# Patient Record
Sex: FEMALE | Race: WHITE | NOT HISPANIC OR LATINO | Employment: FULL TIME | URBAN - METROPOLITAN AREA
[De-identification: names, ages, dates, MRNs, and addresses within clinical notes are randomized per-mention and may not be internally consistent; named-entity substitution may affect disease eponyms.]

---

## 2023-02-23 LAB — URINE CULTURE: NORMAL

## 2023-05-16 ENCOUNTER — OFFICE VISIT (OUTPATIENT)
Dept: PRIMARY CARE | Facility: CLINIC | Age: 23
End: 2023-05-16
Payer: COMMERCIAL

## 2023-05-16 VITALS
DIASTOLIC BLOOD PRESSURE: 60 MMHG | WEIGHT: 187 LBS | HEART RATE: 104 BPM | TEMPERATURE: 97.1 F | SYSTOLIC BLOOD PRESSURE: 118 MMHG | HEIGHT: 67 IN | OXYGEN SATURATION: 98 % | BODY MASS INDEX: 29.35 KG/M2

## 2023-05-16 DIAGNOSIS — M54.16 LUMBAR RADICULOPATHY: ICD-10-CM

## 2023-05-16 DIAGNOSIS — Z3A.21 21 WEEKS GESTATION OF PREGNANCY (HHS-HCC): ICD-10-CM

## 2023-05-16 DIAGNOSIS — Z00.00 WELLNESS EXAMINATION: Primary | ICD-10-CM

## 2023-05-16 PROCEDURE — 99385 PREV VISIT NEW AGE 18-39: CPT | Performed by: FAMILY MEDICINE

## 2023-05-16 PROCEDURE — 1036F TOBACCO NON-USER: CPT | Performed by: FAMILY MEDICINE

## 2023-05-16 PROCEDURE — 99203 OFFICE O/P NEW LOW 30 MIN: CPT | Performed by: FAMILY MEDICINE

## 2023-05-16 RX ORDER — NAPROXEN SODIUM 220 MG/1
162 TABLET, FILM COATED ORAL DAILY
COMMUNITY
Start: 2023-05-14 | End: 2023-10-13 | Stop reason: ALTCHOICE

## 2023-05-16 ASSESSMENT — ENCOUNTER SYMPTOMS
VOICE CHANGE: 0
DYSURIA: 0
FATIGUE: 0
ABDOMINAL PAIN: 0
NECK STIFFNESS: 0
SLEEP DISTURBANCE: 0
SORE THROAT: 0
APPETITE CHANGE: 0
CONSTIPATION: 0
PALPITATIONS: 0
SHORTNESS OF BREATH: 0
NAUSEA: 0
NECK PAIN: 0
FEVER: 0
COUGH: 0
DIZZINESS: 0
RHINORRHEA: 0
VOMITING: 0
CHILLS: 0
BACK PAIN: 1
DIARRHEA: 0

## 2023-05-16 NOTE — PROGRESS NOTES
"Subjective   Patient ID: Cari Forde is a 22 y.o. female who presents for New Patient Visit (Having back pain injured 2 yrs ago on 4 colon, has been getting worse in the last month).    Cari is here for wellness visit and to discuss low back pain.     Two years ago had four colon accident- flew off back. Went to hospital at time. Went back and had xrays. Diagnosed with scoliosis. Symptoms had improved until 1-2 months ago. Getting severe pain in low back. Pain radiates to outer legs. Working on feet. Went to hospital for pain a few weeks ago.            Review of Systems   Constitutional:  Negative for appetite change, chills, fatigue and fever.   HENT:  Negative for congestion, rhinorrhea, sore throat and voice change.    Eyes:  Negative for visual disturbance.   Respiratory:  Negative for cough and shortness of breath.    Cardiovascular:  Negative for chest pain and palpitations.   Gastrointestinal:  Negative for abdominal pain, constipation, diarrhea, nausea and vomiting.   Genitourinary:  Negative for dysuria.   Musculoskeletal:  Positive for back pain. Negative for gait problem, neck pain and neck stiffness.   Skin:  Negative for rash.   Neurological:  Negative for dizziness.   Psychiatric/Behavioral:  Negative for sleep disturbance.        Objective   /60   Pulse 104   Temp 36.2 °C (97.1 °F)   Ht 1.702 m (5' 7\")   Wt 84.8 kg (187 lb)   SpO2 98%   BMI 29.29 kg/m²     Physical Exam  Constitutional:       General: She is not in acute distress.     Appearance: Normal appearance.   HENT:      Head: Normocephalic.      Nose: No congestion.      Mouth/Throat:      Mouth: Mucous membranes are moist.   Eyes:      Extraocular Movements: Extraocular movements intact.      Conjunctiva/sclera: Conjunctivae normal.   Cardiovascular:      Rate and Rhythm: Normal rate and regular rhythm.      Heart sounds: No murmur heard.  Pulmonary:      Effort: Pulmonary effort is normal.      Breath sounds: No " wheezing or rhonchi.   Abdominal:      Palpations: Abdomen is soft.      Tenderness: There is no abdominal tenderness.   Musculoskeletal:         General: No swelling.      Lumbar back: Spasms and tenderness present. No swelling or bony tenderness. Negative right straight leg raise test and negative left straight leg raise test.      Comments: Increased tension at b/l QL   Skin:     General: Skin is warm and dry.   Neurological:      General: No focal deficit present.      Mental Status: She is alert.   Psychiatric:         Mood and Affect: Mood normal.         Behavior: Behavior normal.         Assessment/Plan   Problem List Items Addressed This Visit          Nervous    Lumbar radiculopathy     Referred to PT. Take tylenol for pain.Ok to call for OMT.          Relevant Orders    Referral to Physical Therapy     Other Visit Diagnoses       Wellness examination    -  Primary    Due for Tdap; will receive during pregnancy. Pap smear up-to-date.    21 weeks gestation of pregnancy

## 2023-06-03 LAB
ERYTHROCYTE DISTRIBUTION WIDTH (RATIO) BY AUTOMATED COUNT: 13.2 % (ref 11.5–14.5)
ERYTHROCYTE MEAN CORPUSCULAR HEMOGLOBIN CONCENTRATION (G/DL) BY AUTOMATED: 33.6 G/DL (ref 32–36)
ERYTHROCYTE MEAN CORPUSCULAR VOLUME (FL) BY AUTOMATED COUNT: 91 FL (ref 80–100)
ERYTHROCYTES (10*6/UL) IN BLOOD BY AUTOMATED COUNT: 3.88 X10E12/L (ref 4–5.2)
GLUCOSE, 1 HR SCREEN, PREG: 158 MG/DL
HEMATOCRIT (%) IN BLOOD BY AUTOMATED COUNT: 35.4 % (ref 36–46)
HEMOGLOBIN (G/DL) IN BLOOD: 11.9 G/DL (ref 12–16)
LEUKOCYTES (10*3/UL) IN BLOOD BY AUTOMATED COUNT: 11.2 X10E9/L (ref 4.4–11.3)
PLATELETS (10*3/UL) IN BLOOD AUTOMATED COUNT: 231 X10E9/L (ref 150–450)
REFLEX ADDED, ANEMIA PANEL: ABNORMAL

## 2023-06-13 LAB
GLUCOSE THREE HOUR: 165 MG/DL
GLUCOSE TWO HOUR: 201 MG/DL
GLUCOSE, FASTING: 95 MG/DL
GLUCOSE, ONE HOUR: 222 MG/DL
GTTCM: ABNORMAL

## 2023-07-21 LAB
CREATININE (MG/DL) IN URINE: 54.9 MG/DL (ref 20–320)
PROTEIN (MG/DL) IN URINE: 7 MG/DL (ref 5–24)
PROTEIN/CREATININE (MG/MG) IN URINE: 0.13 MG/MG CREAT (ref 0–0.17)

## 2023-08-01 LAB
ALANINE AMINOTRANSFERASE (SGPT) (U/L) IN SER/PLAS: 11 U/L (ref 7–45)
ALBUMIN (G/DL) IN SER/PLAS: 3.7 G/DL (ref 3.4–5)
ALKALINE PHOSPHATASE (U/L) IN SER/PLAS: 130 U/L (ref 33–110)
ANION GAP IN SER/PLAS: 15 MMOL/L (ref 10–20)
ASPARTATE AMINOTRANSFERASE (SGOT) (U/L) IN SER/PLAS: 11 U/L (ref 9–39)
BILIRUBIN TOTAL (MG/DL) IN SER/PLAS: 0.4 MG/DL (ref 0–1.2)
CALCIUM (MG/DL) IN SER/PLAS: 9.1 MG/DL (ref 8.6–10.3)
CARBON DIOXIDE, TOTAL (MMOL/L) IN SER/PLAS: 23 MMOL/L (ref 21–32)
CHLORIDE (MMOL/L) IN SER/PLAS: 104 MMOL/L (ref 98–107)
CREATININE (MG/DL) IN SER/PLAS: 0.63 MG/DL (ref 0.5–1.05)
ERYTHROCYTE DISTRIBUTION WIDTH (RATIO) BY AUTOMATED COUNT: 12.9 % (ref 11.5–14.5)
ERYTHROCYTE MEAN CORPUSCULAR HEMOGLOBIN CONCENTRATION (G/DL) BY AUTOMATED: 34 G/DL (ref 32–36)
ERYTHROCYTE MEAN CORPUSCULAR VOLUME (FL) BY AUTOMATED COUNT: 88 FL (ref 80–100)
ERYTHROCYTES (10*6/UL) IN BLOOD BY AUTOMATED COUNT: 4.27 X10E12/L (ref 4–5.2)
GFR FEMALE: >90 ML/MIN/1.73M2
GLUCOSE (MG/DL) IN SER/PLAS: 104 MG/DL (ref 74–99)
HEMATOCRIT (%) IN BLOOD BY AUTOMATED COUNT: 37.6 % (ref 36–46)
HEMOGLOBIN (G/DL) IN BLOOD: 12.8 G/DL (ref 12–16)
LACTATE DEHYDROGENASE (U/L) IN SER/PLAS BY LAC->PYR RXN: 128 U/L (ref 84–246)
LEUKOCYTES (10*3/UL) IN BLOOD BY AUTOMATED COUNT: 11.7 X10E9/L (ref 4.4–11.3)
PLATELETS (10*3/UL) IN BLOOD AUTOMATED COUNT: 221 X10E9/L (ref 150–450)
POTASSIUM (MMOL/L) IN SER/PLAS: 3.7 MMOL/L (ref 3.5–5.3)
PROTEIN TOTAL: 6.2 G/DL (ref 6.4–8.2)
SODIUM (MMOL/L) IN SER/PLAS: 138 MMOL/L (ref 136–145)
UREA NITROGEN (MG/DL) IN SER/PLAS: 5 MG/DL (ref 6–23)

## 2023-08-16 LAB
CLUE CELLS: ABNORMAL
NUGENT SCORE: 0
URINE CULTURE: NORMAL
YEAST: PRESENT

## 2023-09-03 LAB — GROUP B STREP SCREEN: ABNORMAL

## 2023-10-12 ENCOUNTER — TELEPHONE (OUTPATIENT)
Dept: OBSTETRICS AND GYNECOLOGY | Facility: CLINIC | Age: 23
End: 2023-10-12
Payer: COMMERCIAL

## 2023-10-12 NOTE — TELEPHONE ENCOUNTER
Spoke with pt. She reports she is experiencing urinary burning and frequency, also has general discomfort that wraps from pelvic area to hips and lower back. Was seen in ED where she was evaluated for umbilical hernia and UTI sx, treated with IV atb for abnormal UA. She proceeded to telemedicine urgent care about 4 days later d/t ongoing UTI symptoms and was treated with 7 days of cipro, but symptoms are ongoing. She did not have a urine cx done with that visit. I looked back and urine cx from ER was negative. No recent fever or flank pain.     Pt also reports she discussed postpartum anxiety at 2 week PPV on 9/21/23 and was started on buspirone 10mg BID. She reports this helped with her anxiety but now is having depression symptoms. Reports flat affect, feeling withdrawn, and feels she is distancing herself from the baby. No thoughts of harming herself or others.     Next visit scheduled 10/24/23. Will review with Dr Contreras and notify pt of recommendations

## 2023-10-12 NOTE — TELEPHONE ENCOUNTER
Dr Contreras recommends pt comes in for eval. Dr Santizo willing to see patient tomorrow around 10am. Confirmed visit with pt. She will call if symptoms worsen in the mean time

## 2023-10-12 NOTE — TELEPHONE ENCOUNTER
Patient states that she was in the ER for a UTI last week, but is still having symptoms of an infection. She also states that she is having problems with her anxiety medication. Would like a call back to see if she needs to come in sooner for an appointment or what she should do. Please advise.

## 2023-10-13 ENCOUNTER — POSTPARTUM VISIT (OUTPATIENT)
Dept: OBSTETRICS AND GYNECOLOGY | Facility: CLINIC | Age: 23
End: 2023-10-13
Payer: COMMERCIAL

## 2023-10-13 VITALS
DIASTOLIC BLOOD PRESSURE: 90 MMHG | WEIGHT: 181 LBS | SYSTOLIC BLOOD PRESSURE: 130 MMHG | BODY MASS INDEX: 33.31 KG/M2 | HEIGHT: 62 IN

## 2023-10-13 DIAGNOSIS — R11.2 NAUSEA AND VOMITING, UNSPECIFIED VOMITING TYPE: ICD-10-CM

## 2023-10-13 DIAGNOSIS — N20.0 KIDNEY STONE ON LEFT SIDE: Primary | ICD-10-CM

## 2023-10-13 DIAGNOSIS — R30.0 DYSURIA: ICD-10-CM

## 2023-10-13 LAB
POC BILIRUBIN, URINE: NEGATIVE
POC BLOOD, URINE: ABNORMAL
POC GLUCOSE, URINE: NEGATIVE MG/DL
POC KETONES, URINE: NEGATIVE MG/DL
POC LEUKOCYTES, URINE: ABNORMAL
POC NITRITE,URINE: NEGATIVE
POC PH, URINE: 7 PH
POC PROTEIN, URINE: NEGATIVE MG/DL
POC SPECIFIC GRAVITY, URINE: 1.02
POC UROBILINOGEN, URINE: 2 EU/DL

## 2023-10-13 PROCEDURE — 87086 URINE CULTURE/COLONY COUNT: CPT

## 2023-10-13 PROCEDURE — 99213 OFFICE O/P EST LOW 20 MIN: CPT | Performed by: OBSTETRICS & GYNECOLOGY

## 2023-10-13 PROCEDURE — 81003 URINALYSIS AUTO W/O SCOPE: CPT | Performed by: OBSTETRICS & GYNECOLOGY

## 2023-10-13 RX ORDER — ONDANSETRON 4 MG/1
4 TABLET, FILM COATED ORAL 2 TIMES DAILY PRN
Qty: 30 TABLET | Refills: 0 | Status: SHIPPED | OUTPATIENT
Start: 2023-10-13 | End: 2023-11-06 | Stop reason: WASHOUT

## 2023-10-13 RX ORDER — BUSPIRONE HYDROCHLORIDE 10 MG/1
10 TABLET ORAL 2 TIMES DAILY
COMMUNITY
End: 2023-11-06 | Stop reason: ALTCHOICE

## 2023-10-13 ASSESSMENT — EDINBURGH POSTNATAL DEPRESSION SCALE (EPDS)
I HAVE FELT SCARED OR PANICKY FOR NO GOOD REASON: NO, NOT AT ALL
I HAVE BEEN SO UNHAPPY THAT I HAVE HAD DIFFICULTY SLEEPING: NOT AT ALL
I HAVE BEEN ABLE TO LAUGH AND SEE THE FUNNY SIDE OF THINGS: NOT QUITE SO MUCH NOW
I HAVE FELT SAD OR MISERABLE: NOT VERY OFTEN
I HAVE BEEN ANXIOUS OR WORRIED FOR NO GOOD REASON: YES, SOMETIMES
TOTAL SCORE: 7
THE THOUGHT OF HARMING MYSELF HAS OCCURRED TO ME: NEVER
THINGS HAVE BEEN GETTING ON TOP OF ME: NO, I HAVE BEEN COPING AS WELL AS EVER
I HAVE BLAMED MYSELF UNNECESSARILY WHEN THINGS WENT WRONG: NOT VERY OFTEN
I HAVE BEEN SO UNHAPPY THAT I HAVE BEEN CRYING: ONLY OCCASIONALLY
I HAVE LOOKED FORWARD WITH ENJOYMENT TO THINGS: RATHER LESS THAN I USED TO

## 2023-10-13 NOTE — PROGRESS NOTES
22 y.o.  presenting for postpartum follow-up   Went to ER on , CT scan was positve for left sided kidney stone C/o dysuria,urine culture was negative   She was started on BuSpar for postpartum depression, 4 weeks ago, she states that there is minimal improvement.  Delivery Date: 2023  Type of Delivery:   Concerns: back pain , dysuria, nausea and vomiting     Pain: controlled  Lacerations: none  Lochia: none  Menses: No  Sexual Intimacy: No  Contraceptive Method: None  Feeding Method:   Lactation Consult Needed?: No    Birth Trauma: No  Mood: Depressed,denies suicidal or homicidal ideation      IMPRESSIONS:  6-week postpartum visit with problem   Problem 1: Nephrolithiasis, urology referral done  Recommend increased hydration and Azo OTC  Problem 2: Postpartum depression  Increase BuSpar dosage to 20 mg a day  Psychology referral done for counseling.  Follow-up as scheduled with primary OB in 2 weeks    Problem List Items Addressed This Visit       Kidney stone on left side - Primary    Relevant Orders    Referral to Urology    Encounter for postpartum visit    Postpartum depression    Relevant Orders    Referral to Psychology     Other Visit Diagnoses       Dysuria        Relevant Orders    POC Urine Dip (Completed)    Referral to Urology            Orders Placed This Encounter   Procedures    Referral to Urology     Standing Status:   Future     Standing Expiration Date:   2024     Referral Priority:   Routine     Referral Type:   Consultation     Referral Reason:   Specialty Services Required     Requested Specialty:   Urology     Number of Visits Requested:   1    Referral to Psychology     Standing Status:   Future     Standing Expiration Date:   2024     Referral Priority:   Routine     Referral Type:   Consultation     Referral Reason:   Specialty Services Required     Requested Specialty:   Psychology     Number of Visits Requested:   1    POC Urine Dip     Order Specific  Question:   Release result to [facility]     Answer:   Immediate [1]       We look forward to seeing you again at your next scheduled visit in 12 months.    Nereida Santizo MD

## 2023-10-15 LAB — BACTERIA UR CULT: NORMAL

## 2023-10-18 ENCOUNTER — POSTPARTUM VISIT (OUTPATIENT)
Dept: OBSTETRICS AND GYNECOLOGY | Facility: CLINIC | Age: 23
End: 2023-10-18
Payer: COMMERCIAL

## 2023-10-18 VITALS
BODY MASS INDEX: 32.94 KG/M2 | DIASTOLIC BLOOD PRESSURE: 78 MMHG | HEIGHT: 62 IN | WEIGHT: 179 LBS | SYSTOLIC BLOOD PRESSURE: 118 MMHG

## 2023-10-18 DIAGNOSIS — O24.414 INSULIN CONTROLLED GESTATIONAL DIABETES MELLITUS (GDM) DURING PREGNANCY, ANTEPARTUM (HHS-HCC): ICD-10-CM

## 2023-10-18 RX ORDER — SERTRALINE HYDROCHLORIDE 50 MG/1
50 TABLET, FILM COATED ORAL DAILY
Qty: 90 TABLET | Refills: 3 | Status: SHIPPED | OUTPATIENT
Start: 2023-10-18 | End: 2023-11-06 | Stop reason: WASHOUT

## 2023-10-18 RX ORDER — ONDANSETRON 4 MG/1
4 TABLET, ORALLY DISINTEGRATING ORAL 3 TIMES DAILY
COMMUNITY
Start: 2023-09-23 | End: 2023-11-06 | Stop reason: WASHOUT

## 2023-10-18 NOTE — ASSESSMENT & PLAN NOTE
She is doing well postpartum. She desires to return for Nexplanon placed.   75 G GTT is ordered to follow up for gestational diabetes.

## 2023-10-18 NOTE — PROGRESS NOTES
Postpartum Progress Note    Assessment/Plan   Cari Forde is a 22 y.o., , postpartum from vaginal delivery on 2023. She delivered a girl. She is bottle feeding. Pregnancy was complicated by gestational hypertension and gestational diabetes on oral medication. She was induced at 37 weeks due to IUGR. She was seen in the ER on 2023 with findings of left kidney stone. At last visit with me on 2023 she was noting anxiety but denied depression. She was willing to start Buspirone for this. She did discuss anxiety with Dr. Santizo at visit on 10/13/2023 and increased the dose of Buspirone then.   She desires to begin antidepressant for symptoms of depression, crying and worsening anxiety. She is also to start counseling and has a referral placed.       Problem List       No episode was linked to this visit.                 Her plan for contraception is none           Allergies:   Patient has no known allergies.         Physical Exam:  Physical Exam  Constitutional:       Appearance: Normal appearance.   Genitourinary:      Bladder, rectum and urethral meatus normal.      Right Labia: No rash or lesions.     Left Labia: No lesions or rash.     No vaginal discharge.      No vaginal prolapse present.     No vaginal atrophy present.       Right Adnexa: not tender and no mass present.     Left Adnexa: not tender and no mass present.     No cervical discharge or lesion.      Uterus is not enlarged or tender.      Pelvic exam was performed with patient in the lithotomy position.   HENT:      Head: Normocephalic and atraumatic.      Nose: Nose normal.   Cardiovascular:      Rate and Rhythm: Normal rate and regular rhythm.      Heart sounds: Normal heart sounds.   Pulmonary:      Effort: Pulmonary effort is normal.      Breath sounds: Normal breath sounds.   Abdominal:      Palpations: Abdomen is soft.   Musculoskeletal:      Cervical back: Neck supple.   Neurological:      General: No focal deficit present.       Mental Status: She is alert and oriented to person, place, and time.   Skin:     General: Skin is warm and dry.      Findings: No rash.   Psychiatric:         Mood and Affect: Mood normal.            Postpartum depression  Has symptoms of anxiety and depression. BuSpar was not effective. Zoloft was prescribed on 10/18/2023. She will also begin counseling.       Encounter for postpartum visit  She is doing well postpartum. She desires to return for Nexplanon placed.   75 G GTT is ordered to follow up for gestational diabetes.

## 2023-10-18 NOTE — ASSESSMENT & PLAN NOTE
Has symptoms of anxiety and depression. BuSpar was not effective. Zoloft was prescribed on 10/18/2023. She will also begin counseling.

## 2023-10-24 ENCOUNTER — HOSPITAL ENCOUNTER (EMERGENCY)
Facility: HOSPITAL | Age: 23
Discharge: HOME | End: 2023-10-24
Attending: EMERGENCY MEDICINE
Payer: COMMERCIAL

## 2023-10-24 ENCOUNTER — APPOINTMENT (OUTPATIENT)
Dept: OBSTETRICS AND GYNECOLOGY | Facility: CLINIC | Age: 23
End: 2023-10-24
Payer: COMMERCIAL

## 2023-10-24 ENCOUNTER — APPOINTMENT (OUTPATIENT)
Dept: RADIOLOGY | Facility: HOSPITAL | Age: 23
End: 2023-10-24
Payer: COMMERCIAL

## 2023-10-24 VITALS
SYSTOLIC BLOOD PRESSURE: 114 MMHG | BODY MASS INDEX: 31.28 KG/M2 | TEMPERATURE: 97.4 F | RESPIRATION RATE: 16 BRPM | DIASTOLIC BLOOD PRESSURE: 71 MMHG | WEIGHT: 170 LBS | HEIGHT: 62 IN | HEART RATE: 78 BPM | OXYGEN SATURATION: 98 %

## 2023-10-24 DIAGNOSIS — G43.019 INTRACTABLE MIGRAINE WITHOUT AURA AND WITHOUT STATUS MIGRAINOSUS: Primary | ICD-10-CM

## 2023-10-24 LAB
ALBUMIN SERPL BCP-MCNC: 4.3 G/DL (ref 3.4–5)
ALP SERPL-CCNC: 96 U/L (ref 33–110)
ALT SERPL W P-5'-P-CCNC: 16 U/L (ref 7–45)
ANION GAP SERPL CALC-SCNC: 11 MMOL/L (ref 10–20)
APPEARANCE UR: ABNORMAL
APTT PPP: 30 SECONDS (ref 27–38)
AST SERPL W P-5'-P-CCNC: 13 U/L (ref 9–39)
BASOPHILS # BLD AUTO: 0.04 X10*3/UL (ref 0–0.1)
BASOPHILS NFR BLD AUTO: 0.5 %
BILIRUB SERPL-MCNC: 0.6 MG/DL (ref 0–1.2)
BILIRUB UR STRIP.AUTO-MCNC: NEGATIVE MG/DL
BUN SERPL-MCNC: 9 MG/DL (ref 6–23)
CALCIUM SERPL-MCNC: 9.2 MG/DL (ref 8.6–10.3)
CHLORIDE SERPL-SCNC: 102 MMOL/L (ref 98–107)
CO2 SERPL-SCNC: 29 MMOL/L (ref 21–32)
COLOR UR: YELLOW
CREAT SERPL-MCNC: 0.79 MG/DL (ref 0.5–1.05)
EOSINOPHIL # BLD AUTO: 0.09 X10*3/UL (ref 0–0.7)
EOSINOPHIL NFR BLD AUTO: 1.2 %
ERYTHROCYTE [DISTWIDTH] IN BLOOD BY AUTOMATED COUNT: 12.1 % (ref 11.5–14.5)
FLUAV RNA RESP QL NAA+PROBE: NOT DETECTED
FLUBV RNA RESP QL NAA+PROBE: NOT DETECTED
GFR SERPL CREATININE-BSD FRML MDRD: >90 ML/MIN/1.73M*2
GLUCOSE BLD MANUAL STRIP-MCNC: 164 MG/DL (ref 74–99)
GLUCOSE SERPL-MCNC: 120 MG/DL (ref 74–99)
GLUCOSE UR STRIP.AUTO-MCNC: NEGATIVE MG/DL
HCG UR QL IA.RAPID: NEGATIVE
HCT VFR BLD AUTO: 39.4 % (ref 36–46)
HGB BLD-MCNC: 13.1 G/DL (ref 12–16)
HOLD SPECIMEN: NORMAL
IMM GRANULOCYTES # BLD AUTO: 0.03 X10*3/UL (ref 0–0.7)
IMM GRANULOCYTES NFR BLD AUTO: 0.4 % (ref 0–0.9)
INR PPP: 1 (ref 0.9–1.1)
KETONES UR STRIP.AUTO-MCNC: NEGATIVE MG/DL
LEUKOCYTE ESTERASE UR QL STRIP.AUTO: ABNORMAL
LIPASE SERPL-CCNC: 14 U/L (ref 9–82)
LYMPHOCYTES # BLD AUTO: 2.92 X10*3/UL (ref 1.2–4.8)
LYMPHOCYTES NFR BLD AUTO: 40.1 %
MCH RBC QN AUTO: 28.6 PG (ref 26–34)
MCHC RBC AUTO-ENTMCNC: 33.2 G/DL (ref 32–36)
MCV RBC AUTO: 86 FL (ref 80–100)
MONOCYTES # BLD AUTO: 0.4 X10*3/UL (ref 0.1–1)
MONOCYTES NFR BLD AUTO: 5.5 %
MUCOUS THREADS #/AREA URNS AUTO: ABNORMAL /LPF
NEUTROPHILS # BLD AUTO: 3.81 X10*3/UL (ref 1.2–7.7)
NEUTROPHILS NFR BLD AUTO: 52.3 %
NITRITE UR QL STRIP.AUTO: NEGATIVE
NRBC BLD-RTO: 0 /100 WBCS (ref 0–0)
PH UR STRIP.AUTO: 6 [PH]
PLATELET # BLD AUTO: 256 X10*3/UL (ref 150–450)
PMV BLD AUTO: 9 FL (ref 7.5–11.5)
POTASSIUM SERPL-SCNC: 3.5 MMOL/L (ref 3.5–5.3)
PROT SERPL-MCNC: 6.7 G/DL (ref 6.4–8.2)
PROT UR STRIP.AUTO-MCNC: NEGATIVE MG/DL
PROTHROMBIN TIME: 11.2 SECONDS (ref 9.8–12.8)
RBC # BLD AUTO: 4.58 X10*6/UL (ref 4–5.2)
RBC # UR STRIP.AUTO: ABNORMAL /UL
RBC #/AREA URNS AUTO: ABNORMAL /HPF
SARS-COV-2 RNA RESP QL NAA+PROBE: NOT DETECTED
SODIUM SERPL-SCNC: 138 MMOL/L (ref 136–145)
SP GR UR STRIP.AUTO: 1.01
SQUAMOUS #/AREA URNS AUTO: ABNORMAL /HPF
UROBILINOGEN UR STRIP.AUTO-MCNC: <2 MG/DL
WBC # BLD AUTO: 7.3 X10*3/UL (ref 4.4–11.3)
WBC #/AREA URNS AUTO: ABNORMAL /HPF

## 2023-10-24 PROCEDURE — 87086 URINE CULTURE/COLONY COUNT: CPT | Mod: AHULAB | Performed by: EMERGENCY MEDICINE

## 2023-10-24 PROCEDURE — 85610 PROTHROMBIN TIME: CPT | Performed by: EMERGENCY MEDICINE

## 2023-10-24 PROCEDURE — 81025 URINE PREGNANCY TEST: CPT | Performed by: EMERGENCY MEDICINE

## 2023-10-24 PROCEDURE — 83690 ASSAY OF LIPASE: CPT | Performed by: EMERGENCY MEDICINE

## 2023-10-24 PROCEDURE — 76937 US GUIDE VASCULAR ACCESS: CPT

## 2023-10-24 PROCEDURE — 85025 COMPLETE CBC W/AUTO DIFF WBC: CPT | Performed by: EMERGENCY MEDICINE

## 2023-10-24 PROCEDURE — 82947 ASSAY GLUCOSE BLOOD QUANT: CPT | Mod: 59

## 2023-10-24 PROCEDURE — 70450 CT HEAD/BRAIN W/O DYE: CPT | Performed by: RADIOLOGY

## 2023-10-24 PROCEDURE — 36415 COLL VENOUS BLD VENIPUNCTURE: CPT | Performed by: EMERGENCY MEDICINE

## 2023-10-24 PROCEDURE — 2500000004 HC RX 250 GENERAL PHARMACY W/ HCPCS (ALT 636 FOR OP/ED): Performed by: EMERGENCY MEDICINE

## 2023-10-24 PROCEDURE — 96375 TX/PRO/DX INJ NEW DRUG ADDON: CPT

## 2023-10-24 PROCEDURE — 80053 COMPREHEN METABOLIC PANEL: CPT | Performed by: EMERGENCY MEDICINE

## 2023-10-24 PROCEDURE — 99284 EMERGENCY DEPT VISIT MOD MDM: CPT | Mod: 25 | Performed by: EMERGENCY MEDICINE

## 2023-10-24 PROCEDURE — 85730 THROMBOPLASTIN TIME PARTIAL: CPT | Performed by: EMERGENCY MEDICINE

## 2023-10-24 PROCEDURE — 87636 SARSCOV2 & INF A&B AMP PRB: CPT | Performed by: EMERGENCY MEDICINE

## 2023-10-24 PROCEDURE — 82947 ASSAY GLUCOSE BLOOD QUANT: CPT

## 2023-10-24 PROCEDURE — 70450 CT HEAD/BRAIN W/O DYE: CPT

## 2023-10-24 PROCEDURE — 96374 THER/PROPH/DIAG INJ IV PUSH: CPT

## 2023-10-24 PROCEDURE — 96361 HYDRATE IV INFUSION ADD-ON: CPT

## 2023-10-24 PROCEDURE — 81001 URINALYSIS AUTO W/SCOPE: CPT | Performed by: EMERGENCY MEDICINE

## 2023-10-24 RX ORDER — KETOROLAC TROMETHAMINE 30 MG/ML
15 INJECTION, SOLUTION INTRAMUSCULAR; INTRAVENOUS ONCE
Status: COMPLETED | OUTPATIENT
Start: 2023-10-24 | End: 2023-10-24

## 2023-10-24 RX ORDER — METOCLOPRAMIDE HYDROCHLORIDE 5 MG/ML
10 INJECTION INTRAMUSCULAR; INTRAVENOUS ONCE
Status: COMPLETED | OUTPATIENT
Start: 2023-10-24 | End: 2023-10-24

## 2023-10-24 RX ADMIN — METOCLOPRAMIDE 10 MG: 5 INJECTION, SOLUTION INTRAMUSCULAR; INTRAVENOUS at 07:17

## 2023-10-24 RX ADMIN — SODIUM CHLORIDE, POTASSIUM CHLORIDE, SODIUM LACTATE AND CALCIUM CHLORIDE 1000 ML: 600; 310; 30; 20 INJECTION, SOLUTION INTRAVENOUS at 07:21

## 2023-10-24 RX ADMIN — KETOROLAC TROMETHAMINE 15 MG: 30 INJECTION, SOLUTION INTRAMUSCULAR; INTRAVENOUS at 07:17

## 2023-10-24 ASSESSMENT — PAIN SCALES - GENERAL
PAINLEVEL_OUTOF10: 0 - NO PAIN
PAINLEVEL_OUTOF10: 7
PAINLEVEL_OUTOF10: 7

## 2023-10-24 ASSESSMENT — COLUMBIA-SUICIDE SEVERITY RATING SCALE - C-SSRS
1. IN THE PAST MONTH, HAVE YOU WISHED YOU WERE DEAD OR WISHED YOU COULD GO TO SLEEP AND NOT WAKE UP?: NO
6. HAVE YOU EVER DONE ANYTHING, STARTED TO DO ANYTHING, OR PREPARED TO DO ANYTHING TO END YOUR LIFE?: NO
2. HAVE YOU ACTUALLY HAD ANY THOUGHTS OF KILLING YOURSELF?: NO

## 2023-10-24 ASSESSMENT — PAIN - FUNCTIONAL ASSESSMENT
PAIN_FUNCTIONAL_ASSESSMENT: 0-10
PAIN_FUNCTIONAL_ASSESSMENT: 0-10

## 2023-10-24 NOTE — DISCHARGE INSTRUCTIONS
Tylenol and or Motrin as needed for headache for the next 2 to 3 days.  Please follow-up with your primary care physician in the next 2 to 3 days repeat exam to ensure improvement in symptoms please return to ED for worsening headache, difficulty moving arms legs, change in behavior or any other concerning symptoms.

## 2023-10-24 NOTE — ED TRIAGE NOTES
Pt 6 weeks PP, HTN and gestational diabetes in pregnancy, arrives to ED with c/o intermittent HA x 1.5 weeks. Endorses photophobia and blurred vision that started today as well as nausea without vomiting. Pt /99 and bgl 164 in triage, not on medications for HTN or DM.

## 2023-10-24 NOTE — ED PROVIDER NOTES
HPI   Chief Complaint   Patient presents with    Headache       This is a 22-year-old female who is 6 weeks status postdelivery who does present with complaint of intermittent headaches for the last week and some nausea and vomiting.  Denies any thunderclap headache.  Denies headache as a worst of life.  Denies any fever or chills.  Denies any dysuria hematuria flank pain.  No vaginal pain or bleeding or discharge.  BPs have been fairly well controlled at home per patient report and she has not needed blood pressure medications or treatment for her gestational diabetes.  She denies any prior history of headaches so this is somewhat atypical for her.  She denies any meningitis symptoms.                        No data recorded                Patient History   No past medical history on file.  Past Surgical History:   Procedure Laterality Date    WISDOM TOOTH EXTRACTION       Family History   Problem Relation Name Age of Onset    Hypertension Mother      Diabetes Maternal Grandmother      Hypertension Maternal Grandmother      Breast cancer Maternal Grandmother       Social History     Tobacco Use    Smoking status: Never    Smokeless tobacco: Never   Vaping Use    Vaping Use: Former    Substances: Nicotine    Devices: Disposable   Substance Use Topics    Alcohol use: Not Currently     Alcohol/week: 4.0 standard drinks of alcohol     Types: 2 Glasses of wine, 2 Standard drinks or equivalent per week    Drug use: Never       Physical Exam   ED Triage Vitals   Temp Heart Rate Resp BP   10/24/23 0612 10/24/23 0612 10/24/23 0612 10/24/23 0612   36.3 °C (97.4 °F) 95 16 (!) 141/99      SpO2 Temp Source Heart Rate Source Patient Position   10/24/23 0612 10/24/23 0612 10/24/23 0900 10/24/23 0900   97 % Tympanic Monitor Sitting      BP Location FiO2 (%)     10/24/23 0900 --     Right arm        Physical Exam  Vitals and nursing note reviewed.   Constitutional:       Appearance: She is well-developed.   HENT:      Head:  Normocephalic and atraumatic.      Mouth/Throat:      Mouth: Mucous membranes are moist.   Eyes:      General: No visual field deficit.     Extraocular Movements: Extraocular movements intact.      Pupils: Pupils are equal, round, and reactive to light.   Cardiovascular:      Rate and Rhythm: Normal rate and regular rhythm.      Heart sounds: Normal heart sounds.   Pulmonary:      Effort: Pulmonary effort is normal.      Breath sounds: Normal breath sounds.   Abdominal:      General: Bowel sounds are normal.   Musculoskeletal:         General: Normal range of motion.      Cervical back: Normal range of motion and neck supple.   Skin:     General: Skin is warm and dry.      Capillary Refill: Capillary refill takes less than 2 seconds.   Neurological:      Mental Status: She is alert and oriented to person, place, and time. Mental status is at baseline.      GCS: GCS eye subscore is 4. GCS verbal subscore is 5. GCS motor subscore is 5.      Cranial Nerves: No cranial nerve deficit or facial asymmetry.      Sensory: No sensory deficit.      Motor: No weakness.      Coordination: Coordination normal.      Gait: Gait normal.   Psychiatric:         Mood and Affect: Mood normal.         Speech: Speech normal.         Behavior: Behavior normal.         ED Course & MDM   Diagnoses as of 10/31/23 0642   Intractable migraine without aura and without status migrainosus       Medical Decision Making  I did place an IV and labs were drawn including CBC and CMP and flu and COVID and CT scan of the head and UA.  CT scan was obtained due to atypical nature of headache.  This is negative for acute pathology.  No elevated blood cell count or stomach infection.  No clinically significant anemia.  No electrolyte abnormalities are noted.  She does have 11-20 WBCs and 6-10 white blood cells but a lot of squamous cells and does not have any UTI type symptoms here.  She does not have elevated blood cell count or stomach infection.  Her  pregnancy test was negative.  COVID test is negative.  Flu and a and B test were negative.  Patient is tolerating p.o.  Her headache does feel better status post Reglan Toradol and fluids.  Patient to be discharged home plan to continue Tylenol Motrin and follow-up with her OB/GYN.  Advised to continue blood pressure monitor at home and is well as further management with OB for her glucose to rule out diabetes and hypertension respectively.  Turn precautions are discussed.        Procedure  Procedures     Brady Mcgee MD  10/24/23 1115       Brady Mcgee MD  10/31/23 0638

## 2023-10-25 LAB — BACTERIA UR CULT: NORMAL

## 2023-10-27 ENCOUNTER — TELEPHONE (OUTPATIENT)
Dept: OBSTETRICS AND GYNECOLOGY | Facility: CLINIC | Age: 23
End: 2023-10-27
Payer: COMMERCIAL

## 2023-10-27 NOTE — TELEPHONE ENCOUNTER
Spoke with patient, states developed numbness then rash after taking zoloft for just over a week.  Did not take last nights dose and is feeling better today with no numbness and improved rash.  Feels as though zoloft was helping her, is there something else she can try?

## 2023-10-27 NOTE — TELEPHONE ENCOUNTER
Patient called stating that yesterday she felt weird and her face was numb. She also claims that she began to get a rash on her cheeks and worked its way down to her neck.  She started on Zoloft last week and is concerned she is having some type of reaction to the medication. Did not have any shortness of breath. She did go to her pharmacist and he told her she was having some type of reaction.  She hasn't taken her medication since Wednesday evening.  She doesn't know what she should do.     She said she took the medication for a total of 7 days.    Please advise

## 2023-10-28 RX ORDER — ESCITALOPRAM OXALATE 10 MG/1
10 TABLET ORAL DAILY
Qty: 30 TABLET | Refills: 11 | Status: SHIPPED | OUTPATIENT
Start: 2023-10-28 | End: 2024-10-27

## 2023-10-30 ENCOUNTER — APPOINTMENT (OUTPATIENT)
Dept: PRIMARY CARE | Facility: CLINIC | Age: 23
End: 2023-10-30
Payer: COMMERCIAL

## 2023-11-02 ENCOUNTER — HOSPITAL ENCOUNTER (EMERGENCY)
Facility: HOSPITAL | Age: 23
Discharge: LEFT BEFORE DISCHARGE | End: 2023-11-02
Payer: COMMERCIAL

## 2023-11-02 PROCEDURE — 4500999001 HC ED NO CHARGE

## 2023-11-06 ENCOUNTER — OFFICE VISIT (OUTPATIENT)
Dept: PRIMARY CARE | Facility: CLINIC | Age: 23
End: 2023-11-06
Payer: COMMERCIAL

## 2023-11-06 ENCOUNTER — PROCEDURE VISIT (OUTPATIENT)
Dept: OBSTETRICS AND GYNECOLOGY | Facility: CLINIC | Age: 23
End: 2023-11-06
Payer: COMMERCIAL

## 2023-11-06 VITALS
BODY MASS INDEX: 32.06 KG/M2 | WEIGHT: 181 LBS | HEART RATE: 100 BPM | DIASTOLIC BLOOD PRESSURE: 84 MMHG | OXYGEN SATURATION: 98 % | TEMPERATURE: 97 F | SYSTOLIC BLOOD PRESSURE: 122 MMHG

## 2023-11-06 VITALS
HEIGHT: 63 IN | DIASTOLIC BLOOD PRESSURE: 80 MMHG | WEIGHT: 179 LBS | SYSTOLIC BLOOD PRESSURE: 122 MMHG | BODY MASS INDEX: 31.71 KG/M2

## 2023-11-06 DIAGNOSIS — R10.9 LEFT FLANK PAIN: ICD-10-CM

## 2023-11-06 DIAGNOSIS — Z30.017 NEXPLANON INSERTION: Primary | ICD-10-CM

## 2023-11-06 DIAGNOSIS — N20.0 KIDNEY STONE ON LEFT SIDE: Primary | ICD-10-CM

## 2023-11-06 DIAGNOSIS — M54.50 ACUTE BILATERAL LOW BACK PAIN WITHOUT SCIATICA: ICD-10-CM

## 2023-11-06 DIAGNOSIS — Z30.017 ENCOUNTER FOR INSERTION SUBDERMAL CONTRACEPTIVE: ICD-10-CM

## 2023-11-06 LAB
POC APPEARANCE, URINE: CLEAR
POC BILIRUBIN, URINE: NEGATIVE
POC BLOOD, URINE: ABNORMAL
POC COLOR, URINE: YELLOW
POC GLUCOSE, URINE: NEGATIVE MG/DL
POC KETONES, URINE: NEGATIVE MG/DL
POC LEUKOCYTES, URINE: ABNORMAL
POC NITRITE,URINE: NEGATIVE
POC PH, URINE: 6 PH
POC PROTEIN, URINE: NEGATIVE MG/DL
POC SPECIFIC GRAVITY, URINE: 1.02
POC UROBILINOGEN, URINE: 0.2 EU/DL
PREGNANCY TEST URINE, POC: NEGATIVE

## 2023-11-06 PROCEDURE — 1036F TOBACCO NON-USER: CPT | Performed by: FAMILY MEDICINE

## 2023-11-06 PROCEDURE — 11981 INSERTION DRUG DLVR IMPLANT: CPT | Performed by: NURSE PRACTITIONER

## 2023-11-06 PROCEDURE — 81025 URINE PREGNANCY TEST: CPT | Performed by: NURSE PRACTITIONER

## 2023-11-06 PROCEDURE — 81003 URINALYSIS AUTO W/O SCOPE: CPT | Performed by: FAMILY MEDICINE

## 2023-11-06 PROCEDURE — 99214 OFFICE O/P EST MOD 30 MIN: CPT | Performed by: FAMILY MEDICINE

## 2023-11-06 PROCEDURE — 87086 URINE CULTURE/COLONY COUNT: CPT

## 2023-11-06 RX ORDER — IBUPROFEN 600 MG/1
600 TABLET ORAL EVERY 6 HOURS PRN
Qty: 30 TABLET | Refills: 1 | Status: SHIPPED | OUTPATIENT
Start: 2023-11-06 | End: 2024-11-05

## 2023-11-06 RX ORDER — ETONOGESTREL 68 MG/1
1 IMPLANT SUBCUTANEOUS ONCE
COMMUNITY

## 2023-11-06 ASSESSMENT — ENCOUNTER SYMPTOMS
BACK PAIN: 1
NEUROLOGICAL NEGATIVE: 1
MUSCULOSKELETAL NEGATIVE: 1
FLANK PAIN: 1
EYES NEGATIVE: 1
FREQUENCY: 0
CARDIOVASCULAR NEGATIVE: 1
DYSURIA: 0
GASTROINTESTINAL NEGATIVE: 1
RESPIRATORY NEGATIVE: 1
DIFFICULTY URINATING: 0
PSYCHIATRIC NEGATIVE: 1
CONSTITUTIONAL NEGATIVE: 1
ENDOCRINE NEGATIVE: 1

## 2023-11-06 NOTE — PROGRESS NOTES
Subjective   Patient ID: Cari Forde is a 22 y.o. female who presents for Back Pain (X 1 month   Pt has kidney stones).    Cari presents to discuss back pain and kidney stones. She has been having pain in her low back and left flank area. Sometimes so severe that she feels nauseated. Pain across low back. No radiation into legs. Using heating pad with minimal improvement. Is scheduled with urologist in December.          Review of Systems   Genitourinary:  Positive for flank pain. Negative for difficulty urinating, dysuria and frequency.   Musculoskeletal:  Positive for back pain.       Objective   /84   Pulse 100   Temp 36.1 °C (97 °F)   Wt 82.1 kg (181 lb)   LMP 11/02/2023   SpO2 98%   BMI 32.06 kg/m²     Physical Exam  Constitutional:       General: She is not in acute distress.     Appearance: Normal appearance.   HENT:      Head: Normocephalic.      Mouth/Throat:      Mouth: Mucous membranes are moist.   Eyes:      Extraocular Movements: Extraocular movements intact.      Conjunctiva/sclera: Conjunctivae normal.   Cardiovascular:      Rate and Rhythm: Normal rate and regular rhythm.      Heart sounds: No murmur heard.  Pulmonary:      Breath sounds: No wheezing or rhonchi.   Musculoskeletal:      Cervical back: Neck supple.      Lumbar back: Tenderness present. No bony tenderness. Normal range of motion.   Skin:     General: Skin is warm and dry.   Neurological:      Mental Status: She is alert.   Psychiatric:         Mood and Affect: Mood normal.         Behavior: Behavior normal.         Assessment/Plan   Diagnoses and all orders for this visit:  Kidney stone on left side  Comments:  Scheduled with urology in December. Discussed ibuprofen for pain control. Stay hydrated. Since sontes <5mm, does not meet criteria for alpha blocker.  Acute bilateral low back pain without sciatica  Comments:  Continue heat. Start stretching. Use ibuprofen as needed.  Orders:  -     ibuprofen 600 mg tablet; Take  1 tablet (600 mg) by mouth every 6 hours if needed for mild pain (1 - 3) (pain).  Left flank pain  -     Urine Culture; Future  -     POCT UA Automated manually resulted

## 2023-11-06 NOTE — PROGRESS NOTES
Subjective   Patient ID: Cari Forde is a 22 y.o. female who presents for Nexplanon insertion  (Patient started her period 11/02/2023).  22 year old here for nexplanon insertion.          Review of Systems   Constitutional: Negative.    HENT: Negative.     Eyes: Negative.    Respiratory: Negative.     Cardiovascular: Negative.    Gastrointestinal: Negative.    Endocrine: Negative.    Genitourinary: Negative.    Musculoskeletal: Negative.    Skin: Negative.    Neurological: Negative.    Psychiatric/Behavioral: Negative.         Objective   Physical Exam  Vitals reviewed.   Constitutional:       Appearance: Normal appearance. She is well-developed.   Pulmonary:      Effort: Pulmonary effort is normal. No respiratory distress.   Chest:   Breasts:     Breasts are symmetrical.      Right: Normal. No swelling, bleeding, inverted nipple, mass, nipple discharge, skin change or tenderness.      Left: Normal. No swelling, bleeding, inverted nipple, mass, nipple discharge, skin change or tenderness.   Abdominal:      Palpations: Abdomen is soft.   Genitourinary:     Vagina: Vaginal discharge present.      Cervix: Normal.      Uterus: Normal.       Adnexa: Right adnexa normal and left adnexa normal.      Rectum: Normal.   Musculoskeletal:         General: Normal range of motion.   Lymphadenopathy:      Upper Body:      Right upper body: No supraclavicular, axillary or pectoral adenopathy.      Left upper body: No supraclavicular, axillary or pectoral adenopathy.   Skin:     General: Skin is warm and dry.   Neurological:      General: No focal deficit present.      Mental Status: She is alert and oriented to person, place, and time. Mental status is at baseline.   Psychiatric:         Attention and Perception: Attention and perception normal.         Mood and Affect: Mood and affect normal.         Speech: Speech normal.         Behavior: Behavior normal. Behavior is cooperative.         Thought Content: Thought content  normal.         Judgment: Judgment normal.         Assessment/Plan   Problem List Items Addressed This Visit             ICD-10-CM    Nexplanon insertion - Primary Z30.017     Other Visit Diagnoses         Codes    Encounter for insertion subdermal contraceptive     Z30.017    Relevant Medications    etonogestrel-eluting contraceptive implant    Other Relevant Orders    POC pregnancy, urine (Completed)        Nexplanon inserted  Follow up as neededPatient ID: Cari Forde is a 22 y.o. female.    Nexplanon insertion    Date/Time: 11/6/2023 12:00 PM    Performed by: RUDY Hernandez  Authorized by: RUDY Hernandez    Consent:     Consent obtained:  Written    Consent given by:  Patient    Procedural risks discussed:  Bleeding and infection    Patient questions answered: yes      Patient agrees, verbalizes understanding, and wants to proceed: yes      Educational handouts given: yes      Instructions and paperwork completed: no    Indication:     Indication: Insertion of non-biodegradable drug delivery implant    Pre-procedure:     Pre-procedure timeout performed: yes      Prepped with comment:  Betadine    Local anesthetic:  Lidocaine 1%    The site was cleaned and prepped in a sterile fashion: yes    Procedure:     Procedure:  Insertion    Small stab incision was made in arm: yes      Left/right:  Left    Preloaded contraceptive capsule trocar was placed subdermally: yes      Visualization of implant was obtained: yes      Contraceptive capsule was inserted and trocar removed: yes      Visualization of notch in stylet and palpation of device: yes      Palpation confirms placement by provider and patient: yes      Site was closed with steri-strips and pressure bandage applied: yes

## 2023-11-08 LAB — BACTERIA UR CULT: NORMAL

## 2023-12-08 ENCOUNTER — TELEPHONE (OUTPATIENT)
Dept: OBSTETRICS AND GYNECOLOGY | Facility: CLINIC | Age: 23
End: 2023-12-08
Payer: COMMERCIAL

## 2023-12-08 NOTE — TELEPHONE ENCOUNTER
Patient called and stated she had the Nexplanon inserted 11/6 and has been spotting ever since.  She also gets nauseated. Please advise.

## 2023-12-18 ENCOUNTER — OFFICE VISIT (OUTPATIENT)
Dept: UROLOGY | Facility: HOSPITAL | Age: 23
End: 2023-12-18
Payer: COMMERCIAL

## 2023-12-18 DIAGNOSIS — N20.0 KIDNEY STONE ON LEFT SIDE: Primary | ICD-10-CM

## 2023-12-18 LAB
POC APPEARANCE, URINE: CLEAR
POC BILIRUBIN, URINE: NEGATIVE
POC BLOOD, URINE: NEGATIVE
POC COLOR, URINE: YELLOW
POC GLUCOSE, URINE: NEGATIVE MG/DL
POC KETONES, URINE: NEGATIVE MG/DL
POC LEUKOCYTES, URINE: ABNORMAL
POC NITRITE,URINE: NEGATIVE
POC PH, URINE: 7 PH
POC PROTEIN, URINE: NEGATIVE MG/DL
POC SPECIFIC GRAVITY, URINE: 1.02
POC UROBILINOGEN, URINE: 0.2 EU/DL

## 2023-12-18 PROCEDURE — 99214 OFFICE O/P EST MOD 30 MIN: CPT | Performed by: NURSE PRACTITIONER

## 2023-12-18 PROCEDURE — 1036F TOBACCO NON-USER: CPT | Performed by: NURSE PRACTITIONER

## 2023-12-18 PROCEDURE — 81003 URINALYSIS AUTO W/O SCOPE: CPT | Performed by: NURSE PRACTITIONER

## 2023-12-18 PROCEDURE — 99204 OFFICE O/P NEW MOD 45 MIN: CPT | Performed by: NURSE PRACTITIONER

## 2023-12-18 NOTE — PROGRESS NOTES
Subjective   Patient ID: Cari Forde is a 23 y.o. female NPV     HPI  Patient presents to clinic today NPV with complaint of intermittent bilateral flank pain.   She denies gross hematuria. History of microscopic hematuria. Menses are irregular, recent   Nexplanon implant, and she has a 3 month old baby.   She denies bothersome urinary symptoms. No frequency, urgency, dysuria, fevers, nausea, or chills.   Reports BL flank pain is intermittent, non radiating. No CVA tenderness.     I personally reviewed and discussed CT Abd and Pelvis w Contrast 09/23/2023 which  demonstrates   3 mm and 2 mm nonobstructive left calculi. No hydronephrosis. IMPRESSION: Small nonobstructive left renal calculi.    She is unable to leave urine sample today     Review of Systems  All other systems have been reviewed and are negative for complaint.    Objective   Physical Exam  General: Well developed, well nourished, alert and cooperative, appears in no acute distress  Eyes: Non-injected conjunctiva, sclera clear, no proptosis  Cardiac: Extremities are warm and well perfused. No edema, cyanosis or pallor.   Lungs: Breathing is easy, non-labored. Speaking in clear and complete sentences. Normal diaphragmatic movement.  MSK: Ambulatory with steady gait, unassisted  Neuro: alert and oriented to person, place and time  Psych: Demonstrates good judgement and reason, without hallucinations, abnormal affect or abnormal behaviors.  Skin: no obvious lesions, no rashes.    Assessment/Plan   Diagnoses and all orders for this visit:  Kidney stone on left side  -     Referral to Urology  -     US renal complete; Future    We discussed that most calculi under 5 mm can be safely observed. This recommendation is based on large series looking at spontaneous passage rates that suggest that the likelihood of stone passage is highest for stones under 4 mm in size (approximately 70-80%), moderate for stones between 4 and 6 mm (50%), and lowest for stones 6  mm or greater (20%-30%). However, stones under 5 mm that are in a solitary kidney, associated with infection or causing significant obstruction should be removed to prevent loss of renal function and/or sepsis. Also, every patient has different anatomy and different ability to pass calculi and tolerate pain, so intervention is also recommended in patients with small stones that are in significant discomfort or that have a history of being unable to pass small calculi. Recommend that she strain urine    Patient was advised to present to local Emergency Department for development of fevers, chills, nausea, vomiting or flank pain that is not controlled with oral pain medication.    Please schedule Renal Ultrasound. Patient will follow up virtually to discuss results.     All questions and concerns were addressed. Patient verbalizes understanding and has no other questions at this time.   You are able to have email access to your chart. You can sign into [employer] or add the Follow My Health maddie on your smart phone to review today's visit, laboratory work and imaging.     Milady Farrell-- TONE SETH  Office Phone:  [phone]

## 2023-12-28 ENCOUNTER — HOSPITAL ENCOUNTER (OUTPATIENT)
Dept: RADIOLOGY | Facility: HOSPITAL | Age: 23
End: 2023-12-28
Payer: COMMERCIAL

## 2024-01-29 ENCOUNTER — HOSPITAL ENCOUNTER (OUTPATIENT)
Dept: RADIOLOGY | Facility: CLINIC | Age: 24
Discharge: HOME | End: 2024-01-29
Payer: COMMERCIAL

## 2024-01-29 DIAGNOSIS — N20.0 KIDNEY STONE ON LEFT SIDE: ICD-10-CM

## 2024-01-29 PROCEDURE — 76770 US EXAM ABDO BACK WALL COMP: CPT

## 2024-03-18 ENCOUNTER — APPOINTMENT (OUTPATIENT)
Dept: PRIMARY CARE | Facility: CLINIC | Age: 24
End: 2024-03-18
Payer: COMMERCIAL

## 2024-03-29 ENCOUNTER — OFFICE VISIT (OUTPATIENT)
Dept: PRIMARY CARE | Facility: CLINIC | Age: 24
End: 2024-03-29
Payer: COMMERCIAL

## 2024-03-29 ENCOUNTER — LAB (OUTPATIENT)
Dept: LAB | Facility: LAB | Age: 24
End: 2024-03-29
Payer: COMMERCIAL

## 2024-03-29 VITALS
BODY MASS INDEX: 33.83 KG/M2 | WEIGHT: 191 LBS | SYSTOLIC BLOOD PRESSURE: 118 MMHG | OXYGEN SATURATION: 97 % | TEMPERATURE: 97 F | HEART RATE: 76 BPM | DIASTOLIC BLOOD PRESSURE: 70 MMHG

## 2024-03-29 DIAGNOSIS — R23.3 EASY BRUISING: ICD-10-CM

## 2024-03-29 DIAGNOSIS — E65 FAT PAD: ICD-10-CM

## 2024-03-29 DIAGNOSIS — G44.209 TENSION HEADACHE: Primary | ICD-10-CM

## 2024-03-29 PROCEDURE — 99214 OFFICE O/P EST MOD 30 MIN: CPT | Performed by: FAMILY MEDICINE

## 2024-03-29 PROCEDURE — 1036F TOBACCO NON-USER: CPT | Performed by: FAMILY MEDICINE

## 2024-03-29 RX ORDER — CYCLOBENZAPRINE HCL 5 MG
5 TABLET ORAL NIGHTLY PRN
Qty: 15 TABLET | Refills: 0 | Status: SHIPPED | OUTPATIENT
Start: 2024-03-29

## 2024-03-29 ASSESSMENT — ENCOUNTER SYMPTOMS
NECK PAIN: 1
DIZZINESS: 1
HEADACHES: 1

## 2024-03-29 NOTE — PROGRESS NOTES
Subjective   Patient ID: Cari Forde is a 23 y.o. female who presents for Headache (X 1 month/).    Cari presents to discuss headaches. She has been having recurrent headaches for the past month. Located on front part of head and at temporal region. Feels like pressure. Associated neck pain. Tried taking multiple OTC medications but still having persistent headaches.     She has noticed easy bruising. No recent bleeding.          Review of Systems   Musculoskeletal:  Positive for neck pain.   Neurological:  Positive for dizziness and headaches.       Objective   /70   Pulse 76   Temp 36.1 °C (97 °F)   Wt 86.6 kg (191 lb)   SpO2 97%   BMI 33.83 kg/m²     Physical Exam  Constitutional:       General: She is not in acute distress.     Appearance: Normal appearance.   HENT:      Head: Normocephalic.   Pulmonary:      Effort: Pulmonary effort is normal.   Musculoskeletal:      Cervical back: Deformity and tenderness present. No swelling, rigidity or bony tenderness. Normal range of motion.      Comments: Large fat pad deposit at back of neck.   Neurological:      General: No focal deficit present.      Mental Status: She is alert.   Psychiatric:         Mood and Affect: Mood normal.         Assessment/Plan   Diagnoses and all orders for this visit:  Tension headache  Comments:  Start cyclobenzaprine prn. Recomend heat to neck and stretching. If not improving, consider neck XR and PT.  Orders:  -     cyclobenzaprine (Flexeril) 5 mg tablet; Take 1 tablet (5 mg) by mouth as needed at bedtime for muscle spasms.  Fat pad  Comments:  Cushing-like fat pad deposition at posterior neck. Since patient has also noted easy bruising, will check 24-h urine cortisol. Patient declined labs.  Orders:  -     Cortisol, Urine, Free; Future  Easy bruising  -     Cortisol, Urine, Free; Future

## 2024-04-03 ENCOUNTER — LAB (OUTPATIENT)
Dept: LAB | Facility: LAB | Age: 24
End: 2024-04-03
Payer: COMMERCIAL

## 2024-04-03 PROCEDURE — 82530 CORTISOL FREE: CPT

## 2024-04-07 LAB
ANNOTATION COMMENT IMP: NORMAL
COLLECT DURATION TIME SPEC: 24 HR
CORTIS F 24H UR HPLC-MCNC: 20.8 UG/L
CORTIS F 24H UR-MRATE: 12.5 UG/D
CORTIS F/CREAT 24H UR: 11.24 UG/G CRT
CREAT 24H UR-MRATE: 1110 MG/D (ref 700–1600)
CREAT UR-MCNC: 185 MG/DL
SPECIMEN VOL ?TM UR: 600 ML

## 2024-04-09 ENCOUNTER — TELEPHONE (OUTPATIENT)
Dept: PRIMARY CARE | Facility: CLINIC | Age: 24
End: 2024-04-09
Payer: COMMERCIAL

## 2024-04-09 NOTE — TELEPHONE ENCOUNTER
Please let patient know that her urine test was normal. She likely does not have Cushing's disease.

## 2024-04-17 ENCOUNTER — APPOINTMENT (OUTPATIENT)
Dept: PRIMARY CARE | Facility: CLINIC | Age: 24
End: 2024-04-17
Payer: COMMERCIAL

## 2024-05-03 ENCOUNTER — APPOINTMENT (OUTPATIENT)
Dept: PRIMARY CARE | Facility: CLINIC | Age: 24
End: 2024-05-03
Payer: COMMERCIAL

## 2024-05-10 ENCOUNTER — APPOINTMENT (OUTPATIENT)
Dept: PRIMARY CARE | Facility: CLINIC | Age: 24
End: 2024-05-10
Payer: COMMERCIAL

## 2024-05-10 RX ORDER — SYRINGE AND NEEDLE,INSULIN,1ML 28GX1/2"
SYRINGE, EMPTY DISPOSABLE MISCELLANEOUS
COMMUNITY
Start: 2024-05-01

## 2024-05-10 RX ORDER — BROMPHENIRAMINE MALEATE, PSEUDOEPHEDRINE HYDROCHLORIDE, AND DEXTROMETHORPHAN HYDROBROMIDE 2; 30; 10 MG/5ML; MG/5ML; MG/5ML
SYRUP ORAL
COMMUNITY
Start: 2024-05-01

## 2024-05-10 RX ORDER — AMOXICILLIN 500 MG/1
500 CAPSULE ORAL 2 TIMES DAILY
COMMUNITY
Start: 2024-05-06

## 2024-05-17 ENCOUNTER — APPOINTMENT (OUTPATIENT)
Dept: PRIMARY CARE | Facility: CLINIC | Age: 24
End: 2024-05-17
Payer: COMMERCIAL

## 2024-07-18 PROBLEM — Z97.5 NEXPLANON IN PLACE: Status: ACTIVE | Noted: 2023-11-06

## 2024-07-24 ENCOUNTER — APPOINTMENT (OUTPATIENT)
Dept: OBSTETRICS AND GYNECOLOGY | Facility: CLINIC | Age: 24
End: 2024-07-24
Payer: COMMERCIAL

## 2024-07-26 ENCOUNTER — TELEPHONE (OUTPATIENT)
Dept: OBSTETRICS AND GYNECOLOGY | Facility: CLINIC | Age: 24
End: 2024-07-26
Payer: COMMERCIAL

## 2024-07-26 NOTE — TELEPHONE ENCOUNTER
Patient would like refills of her medication LEXAPRO sent into her pharmacy please.     Pharmacy has changed to - [facility] in [place] (updated in chart).

## 2024-07-29 RX ORDER — ESCITALOPRAM OXALATE 10 MG/1
10 TABLET ORAL DAILY
Qty: 30 TABLET | Refills: 11 | Status: SHIPPED | OUTPATIENT
Start: 2024-07-29 | End: 2025-07-29

## 2024-09-06 PROBLEM — Z01.419 WELL WOMAN EXAM WITH ROUTINE GYNECOLOGICAL EXAM: Status: ACTIVE | Noted: 2024-09-06

## 2024-09-12 ENCOUNTER — APPOINTMENT (OUTPATIENT)
Dept: OBSTETRICS AND GYNECOLOGY | Facility: CLINIC | Age: 24
End: 2024-09-12
Payer: COMMERCIAL

## 2024-10-24 ENCOUNTER — APPOINTMENT (OUTPATIENT)
Dept: PRIMARY CARE | Facility: CLINIC | Age: 24
End: 2024-10-24
Payer: COMMERCIAL

## 2024-10-24 VITALS
SYSTOLIC BLOOD PRESSURE: 120 MMHG | DIASTOLIC BLOOD PRESSURE: 74 MMHG | BODY MASS INDEX: 36.14 KG/M2 | HEIGHT: 63 IN | WEIGHT: 204 LBS | HEART RATE: 93 BPM | OXYGEN SATURATION: 99 %

## 2024-10-24 DIAGNOSIS — Z13.228 SCREENING FOR ENDOCRINE, METABOLIC AND IMMUNITY DISORDER: ICD-10-CM

## 2024-10-24 DIAGNOSIS — Z13.0 SCREENING FOR ENDOCRINE, METABOLIC AND IMMUNITY DISORDER: ICD-10-CM

## 2024-10-24 DIAGNOSIS — E66.9 OBESITY (BMI 35.0-39.9 WITHOUT COMORBIDITY): ICD-10-CM

## 2024-10-24 DIAGNOSIS — G44.229 CHRONIC TENSION-TYPE HEADACHE, NOT INTRACTABLE: ICD-10-CM

## 2024-10-24 DIAGNOSIS — Z80.3 FH: BREAST CANCER IN RELATIVE WHEN <45 YEARS OLD: Primary | ICD-10-CM

## 2024-10-24 DIAGNOSIS — Z13.29 SCREENING FOR ENDOCRINE, METABOLIC AND IMMUNITY DISORDER: ICD-10-CM

## 2024-10-24 PROCEDURE — 3008F BODY MASS INDEX DOCD: CPT | Performed by: STUDENT IN AN ORGANIZED HEALTH CARE EDUCATION/TRAINING PROGRAM

## 2024-10-24 PROCEDURE — 99214 OFFICE O/P EST MOD 30 MIN: CPT | Performed by: STUDENT IN AN ORGANIZED HEALTH CARE EDUCATION/TRAINING PROGRAM

## 2024-10-24 PROCEDURE — 1036F TOBACCO NON-USER: CPT | Performed by: STUDENT IN AN ORGANIZED HEALTH CARE EDUCATION/TRAINING PROGRAM

## 2024-10-24 RX ORDER — RIBOFLAVIN (VITAMIN B2) 100 MG
100 TABLET ORAL DAILY
Qty: 100 TABLET | Refills: 3 | Status: SHIPPED | OUTPATIENT
Start: 2024-10-24

## 2024-10-24 RX ORDER — MAGNESIUM GLYCINATE 100 MG
100 CAPSULE ORAL DAILY
Qty: 100 CAPSULE | Refills: 3 | Status: SHIPPED | OUTPATIENT
Start: 2024-10-24

## 2024-10-24 RX ORDER — MELOXICAM 15 MG/1
15 TABLET ORAL DAILY PRN
Qty: 30 TABLET | Refills: 11 | Status: SHIPPED | OUTPATIENT
Start: 2024-10-24 | End: 2025-10-24

## 2024-10-24 ASSESSMENT — COLUMBIA-SUICIDE SEVERITY RATING SCALE - C-SSRS
6. HAVE YOU EVER DONE ANYTHING, STARTED TO DO ANYTHING, OR PREPARED TO DO ANYTHING TO END YOUR LIFE?: NO
1. IN THE PAST MONTH, HAVE YOU WISHED YOU WERE DEAD OR WISHED YOU COULD GO TO SLEEP AND NOT WAKE UP?: NO
2. HAVE YOU ACTUALLY HAD ANY THOUGHTS OF KILLING YOURSELF?: NO

## 2024-10-24 ASSESSMENT — ENCOUNTER SYMPTOMS
ARTHRALGIAS: 0
ABDOMINAL PAIN: 1
DEPRESSION: 0
SPEECH DIFFICULTY: 0
FATIGUE: 1
NECK PAIN: 1
APPETITE CHANGE: 0
PALPITATIONS: 0
LOSS OF SENSATION IN FEET: 0
FEVER: 0
FACIAL ASYMMETRY: 0
UNEXPECTED WEIGHT CHANGE: 1
CONFUSION: 0
DYSPHORIC MOOD: 0
OCCASIONAL FEELINGS OF UNSTEADINESS: 0
HEADACHES: 1
ACTIVITY CHANGE: 0

## 2024-10-24 ASSESSMENT — PATIENT HEALTH QUESTIONNAIRE - PHQ9
1. LITTLE INTEREST OR PLEASURE IN DOING THINGS: NOT AT ALL
2. FEELING DOWN, DEPRESSED OR HOPELESS: NOT AT ALL
SUM OF ALL RESPONSES TO PHQ9 QUESTIONS 1 AND 2: 0

## 2024-10-24 NOTE — ASSESSMENT & PLAN NOTE
Guillermoer-Deborah (v8) risk assessment model 24.9 showing increased risk  Orders placed for MRI breasts for initial testing  Mother is getting genetic testing, depending on results Cari may also benefit from genetics

## 2024-10-24 NOTE — ASSESSMENT & PLAN NOTE
Will start lab workup  Nutritionist consult, if symptoms persist with lifestyle changes next visit will discuss further interventional options

## 2024-10-24 NOTE — PROGRESS NOTES
"Patient Name:  Cari Forde  MRN:  [id number]  :  2000    Subjective   Patient ID: Cari Forde is a 23 y.o. female who presents for Hospital Follow-up (PT here to est care, mom (Lidia Forde) was just diagnosed with breast cancer and when should she get screened? Headaches everyday, weight.).    HPI     24 yo female presents to [facility] as a new patient she has multiple concerns today as well     Mom breast cancer diagnosis recently, 42-43     Tyrer-Cuzick (v8) risk assessment model 24.9    Headaches- chronic for years  Flexeril helps at night but cannot take during it during the day   Occipital in nature sometimes around the eyes   Tylenol doesn't help much   Migraines in the family     Since having a baby having a hard time losing     Review of Systems   Constitutional:  Positive for fatigue and unexpected weight change. Negative for activity change, appetite change and fever.   Eyes:  Negative for visual disturbance.   Cardiovascular:  Negative for chest pain, palpitations and leg swelling.   Gastrointestinal:  Positive for abdominal pain.   Musculoskeletal:  Positive for neck pain. Negative for arthralgias and gait problem.   Skin:  Negative for rash.   Allergic/Immunologic: Negative for immunocompromised state.   Neurological:  Positive for headaches. Negative for facial asymmetry and speech difficulty.   Psychiatric/Behavioral:  Negative for confusion and dysphoric mood.      Objective   /74 (BP Location: Left arm, Patient Position: Sitting)   Pulse 93   Ht 1.6 m (5' 3\")   Wt 92.5 kg (204 lb)   SpO2 99%   BMI 36.14 kg/m²     Physical Exam  Constitutional:       Appearance: Normal appearance.   HENT:      Head: Normocephalic and atraumatic.      Right Ear: Tympanic membrane normal.      Left Ear: Tympanic membrane normal.   Eyes:      Extraocular Movements: Extraocular movements intact.   Cardiovascular:      Rate and Rhythm: Normal rate and regular rhythm.   Pulmonary:      Effort: " Pulmonary effort is normal. No respiratory distress.   Musculoskeletal:      Right lower leg: No edema.      Left lower leg: No edema.   Skin:     Coloration: Skin is not jaundiced or pale.   Neurological:      General: No focal deficit present.      Mental Status: She is alert and oriented to person, place, and time.   Psychiatric:         Mood and Affect: Mood normal.         Behavior: Behavior normal.         Thought Content: Thought content normal.         Judgment: Judgment normal.         Assessment/Plan   Problem List Items Addressed This Visit             ICD-10-CM    FH: breast cancer in relative when <45 years old - Primary (Chronic) Z80.3     Guillermoer-Deborah (v8) risk assessment model 24.9 showing increased risk  Orders placed for MRI breasts for initial testing  Mother is getting genetic testing, depending on results Cari may also benefit from genetics         Relevant Orders    MR breast bilateral w contrast full protocol    Chronic tension-type headache, not intractable (Chronic) G44.229     Hydrate well, screen breaks, proper posture  Trial of magnesium and b2 supplements  Meloxicam, with food, for breakthrough symptoms             Relevant Medications    meloxicam (Mobic) 15 mg tablet    riboflavin (vitamin B2) 100 mg tablet tablet    magnesium glycinate 100 mg magnesium capsule    Obesity (BMI 35.0-39.9 without comorbidity) (Chronic) E66.9     Will start lab workup  Nutritionist consult, if symptoms persist with lifestyle changes next visit will discuss further interventional options         Relevant Orders    Tsh With Reflex To Free T4 If Abnormal    Hemoglobin A1c    CBC and Auto Differential    Comprehensive metabolic panel    Referral to Nutrition Services    Follow Up In [facility] - PCP - Established     Other Visit Diagnoses         Codes    Screening for endocrine, metabolic and immunity disorder     Z13.29, Z13.228, Z13.0    Relevant Orders    Tsh With Reflex To Free T4 If  Abnormal    Hemoglobin A1c    CBC and Auto Differential    Comprehensive metabolic panel    Lipid Panel

## 2024-10-24 NOTE — ASSESSMENT & PLAN NOTE
Hydrate well, screen breaks, proper posture  Trial of magnesium and b2 supplements  Meloxicam, with food, for breakthrough symptoms

## 2024-11-08 ENCOUNTER — APPOINTMENT (OUTPATIENT)
Dept: RADIOLOGY | Facility: HOSPITAL | Age: 24
End: 2024-11-08
Payer: COMMERCIAL

## 2024-11-26 ENCOUNTER — APPOINTMENT (OUTPATIENT)
Facility: CLINIC | Age: 24
End: 2024-11-26
Payer: COMMERCIAL

## 2024-12-02 ENCOUNTER — TELEPHONE (OUTPATIENT)
Dept: OBSTETRICS AND GYNECOLOGY | Facility: CLINIC | Age: 24
End: 2024-12-02
Payer: COMMERCIAL

## 2024-12-02 NOTE — TELEPHONE ENCOUNTER
Cari called stating she is experiencing irregular bleeding and abdominal pain on her left side. The bleeding has been going on for the past 10 days. The left side pain is only during intercourse. Please advise. Thank you

## 2024-12-17 ENCOUNTER — APPOINTMENT (OUTPATIENT)
Dept: OBSTETRICS AND GYNECOLOGY | Facility: CLINIC | Age: 24
End: 2024-12-17
Payer: COMMERCIAL

## 2024-12-17 VITALS
SYSTOLIC BLOOD PRESSURE: 122 MMHG | HEIGHT: 63 IN | WEIGHT: 206 LBS | DIASTOLIC BLOOD PRESSURE: 84 MMHG | BODY MASS INDEX: 36.5 KG/M2

## 2024-12-17 DIAGNOSIS — R10.2 PELVIC PAIN IN FEMALE: ICD-10-CM

## 2024-12-17 DIAGNOSIS — N92.1 BREAKTHROUGH BLEEDING ON NEXPLANON: Primary | ICD-10-CM

## 2024-12-17 DIAGNOSIS — Z11.3 SCREENING FOR STD (SEXUALLY TRANSMITTED DISEASE): ICD-10-CM

## 2024-12-17 DIAGNOSIS — N89.8 VAGINAL DISCHARGE: ICD-10-CM

## 2024-12-17 DIAGNOSIS — Z97.5 BREAKTHROUGH BLEEDING ON NEXPLANON: Primary | ICD-10-CM

## 2024-12-17 PROCEDURE — 1036F TOBACCO NON-USER: CPT | Performed by: NURSE PRACTITIONER

## 2024-12-17 PROCEDURE — 99214 OFFICE O/P EST MOD 30 MIN: CPT | Performed by: NURSE PRACTITIONER

## 2024-12-17 PROCEDURE — 87491 CHLMYD TRACH DNA AMP PROBE: CPT

## 2024-12-17 PROCEDURE — 87205 SMEAR GRAM STAIN: CPT

## 2024-12-17 PROCEDURE — 87661 TRICHOMONAS VAGINALIS AMPLIF: CPT

## 2024-12-17 PROCEDURE — 3008F BODY MASS INDEX DOCD: CPT | Performed by: NURSE PRACTITIONER

## 2024-12-17 PROCEDURE — 87591 N.GONORRHOEAE DNA AMP PROB: CPT

## 2024-12-17 ASSESSMENT — ENCOUNTER SYMPTOMS
BACK PAIN: 1
CHILLS: 0
CONSTIPATION: 0
ANOREXIA: 0
NAUSEA: 0
DYSURIA: 0
SORE THROAT: 0
HEADACHES: 1
ABDOMINAL PAIN: 1
FEVER: 0
HEMATURIA: 0
VOMITING: 0
FLANK PAIN: 0
FREQUENCY: 0
DIARRHEA: 0

## 2024-12-17 NOTE — PROGRESS NOTES
Subjective   Patient ID: Cari Forde is a 24 y.o. female who presents for pain with intercourse (Irregular bleeding).  24 year old here for complaints of having breakthrough bleeding.  She notes that for the last month she has had spotting off and on every few days.  She denies any new partners.  She denies any urinary changes, discharge.  She has had some pelvic pain on the left side.  The pain is sharp and slightly crampy, but it goes away.  She denies any other complaints.     Female  Problem  The patient's primary symptoms include pelvic pain, vaginal bleeding and vaginal discharge. The patient's pertinent negatives include no genital itching, genital lesions, genital odor or genital rash. This is a new problem. The current episode started 1 to 4 weeks ago. The problem occurs every several days. The problem has been waxing and waning. The pain is moderate. The problem affects both sides. She is not pregnant. Associated symptoms include abdominal pain, back pain, headaches and painful intercourse. Pertinent negatives include no anorexia, chills, constipation, diarrhea, discolored urine, dysuria, fever, flank pain, frequency, hematuria, joint pain, joint swelling, nausea, rash, sore throat, urgency or vomiting. The vaginal discharge was clear, mucoid and thick. The vaginal bleeding is lighter than menses. She has not been passing clots. She has not been passing tissue. The symptoms are aggravated by intercourse and tactile pressure. She is sexually active. No, her partner does not have an STD. Her menstrual history has been irregular.       Review of Systems   Constitutional:  Negative for chills and fever.   HENT:  Negative for sore throat.    Gastrointestinal:  Positive for abdominal pain. Negative for anorexia, constipation, diarrhea, nausea and vomiting.   Genitourinary:  Positive for pelvic pain and vaginal discharge. Negative for dysuria, flank pain, frequency, hematuria and urgency.   Musculoskeletal:   Positive for back pain. Negative for joint pain.   Skin:  Negative for rash.   Neurological:  Positive for headaches.       Objective   Physical Exam  Vitals reviewed.   Constitutional:       Appearance: Normal appearance. She is well-developed.   Pulmonary:      Effort: Pulmonary effort is normal. No respiratory distress.   Chest:   Breasts:     Breasts are symmetrical.      Right: Normal. No swelling, bleeding, inverted nipple, mass, nipple discharge, skin change or tenderness.      Left: Normal. No swelling, bleeding, inverted nipple, mass, nipple discharge, skin change or tenderness.   Abdominal:      Palpations: Abdomen is soft.   Genitourinary:     General: Normal vulva.      Exam position: Lithotomy position.      Pubic Area: No rash.       Labia:         Right: No rash, tenderness, lesion or injury.         Left: No rash, tenderness, lesion or injury.       Urethra: No prolapse, urethral pain, urethral swelling or urethral lesion.      Vagina: Bleeding present.      Cervix: Normal.      Uterus: Normal.       Adnexa: Right adnexa normal and left adnexa normal.      Rectum: Normal.   Musculoskeletal:         General: Normal range of motion.   Lymphadenopathy:      Upper Body:      Right upper body: No supraclavicular, axillary or pectoral adenopathy.      Left upper body: No supraclavicular, axillary or pectoral adenopathy.   Skin:     General: Skin is warm and dry.   Neurological:      General: No focal deficit present.      Mental Status: She is alert and oriented to person, place, and time. Mental status is at baseline.   Psychiatric:         Attention and Perception: Attention and perception normal.         Mood and Affect: Mood and affect normal.         Speech: Speech normal.         Behavior: Behavior normal. Behavior is cooperative.         Thought Content: Thought content normal.         Judgment: Judgment normal.         Assessment/Plan   Problem List Items Addressed This Visit    None  Visit  Diagnoses         Codes    Breakthrough bleeding on Nexplanon    -  Primary N92.1, Z97.5    Relevant Orders    US PELVIS TRANSABDOMINAL WITH TRANSVAGINAL    Screening for STD (sexually transmitted disease)     Z11.3    Relevant Orders    C. trachomatis / N. gonorrhoeae, Amplified    Trichomonas vaginalis, Amplified    Vaginal discharge     N89.8    Relevant Orders    Vaginitis Gram Stain For Bacterial Vaginosis + Yeast    Pelvic pain in female     R10.2        Will treat pending results if needed  If normal results reviewed can be related to nexplanon and can try 1 month of estradiol, if no improvement patient desires removal in Jan/feb anyway  Follow up as needed         ROLO Hernandez-CNP 12/17/24 1:37 PM

## 2024-12-18 ENCOUNTER — HOSPITAL ENCOUNTER (OUTPATIENT)
Dept: RADIOLOGY | Facility: HOSPITAL | Age: 24
Discharge: HOME | End: 2024-12-18
Payer: COMMERCIAL

## 2024-12-18 DIAGNOSIS — Z97.5 BREAKTHROUGH BLEEDING ON NEXPLANON: ICD-10-CM

## 2024-12-18 DIAGNOSIS — N92.1 BREAKTHROUGH BLEEDING ON NEXPLANON: ICD-10-CM

## 2024-12-18 LAB
C TRACH RRNA SPEC QL NAA+PROBE: NEGATIVE
CLUE CELLS VAG LPF-#/AREA: NORMAL /[LPF]
N GONORRHOEA DNA SPEC QL PROBE+SIG AMP: NEGATIVE
NUGENT SCORE: 2
T VAGINALIS RRNA SPEC QL NAA+PROBE: NEGATIVE
YEAST VAG WET PREP-#/AREA: NORMAL

## 2024-12-18 PROCEDURE — 76830 TRANSVAGINAL US NON-OB: CPT

## 2024-12-18 PROCEDURE — 76856 US EXAM PELVIC COMPLETE: CPT | Performed by: RADIOLOGY

## 2024-12-18 PROCEDURE — 76830 TRANSVAGINAL US NON-OB: CPT | Performed by: RADIOLOGY

## 2024-12-23 DIAGNOSIS — N92.1 BREAKTHROUGH BLEEDING ON NEXPLANON: Primary | ICD-10-CM

## 2024-12-23 DIAGNOSIS — Z97.5 BREAKTHROUGH BLEEDING ON NEXPLANON: Primary | ICD-10-CM

## 2024-12-23 RX ORDER — ESTRADIOL 1 MG/1
1 TABLET ORAL DAILY
Qty: 30 TABLET | Refills: 0 | Status: SHIPPED | OUTPATIENT
Start: 2024-12-23 | End: 2025-12-23

## 2024-12-31 ENCOUNTER — PATIENT MESSAGE (OUTPATIENT)
Dept: OBSTETRICS AND GYNECOLOGY | Facility: CLINIC | Age: 24
End: 2024-12-31
Payer: COMMERCIAL

## 2024-12-31 ENCOUNTER — HOSPITAL ENCOUNTER (EMERGENCY)
Facility: HOSPITAL | Age: 24
Discharge: HOME | End: 2024-12-31
Attending: EMERGENCY MEDICINE
Payer: COMMERCIAL

## 2024-12-31 ENCOUNTER — APPOINTMENT (OUTPATIENT)
Dept: RADIOLOGY | Facility: HOSPITAL | Age: 24
End: 2024-12-31
Payer: COMMERCIAL

## 2024-12-31 VITALS
DIASTOLIC BLOOD PRESSURE: 76 MMHG | WEIGHT: 206 LBS | TEMPERATURE: 98.1 F | OXYGEN SATURATION: 96 % | HEART RATE: 80 BPM | RESPIRATION RATE: 18 BRPM | BODY MASS INDEX: 36.49 KG/M2 | SYSTOLIC BLOOD PRESSURE: 114 MMHG

## 2024-12-31 DIAGNOSIS — R10.84 GENERALIZED ABDOMINAL PAIN: ICD-10-CM

## 2024-12-31 DIAGNOSIS — R11.0 NAUSEA: ICD-10-CM

## 2024-12-31 DIAGNOSIS — R10.2 PELVIC PAIN IN FEMALE: Primary | ICD-10-CM

## 2024-12-31 LAB
ALBUMIN SERPL BCP-MCNC: 4.1 G/DL (ref 3.4–5)
ALP SERPL-CCNC: 79 U/L (ref 33–110)
ALT SERPL W P-5'-P-CCNC: 32 U/L (ref 7–45)
ANION GAP SERPL CALC-SCNC: 9 MMOL/L (ref 10–20)
APPEARANCE UR: CLEAR
AST SERPL W P-5'-P-CCNC: 19 U/L (ref 9–39)
B-HCG SERPL-ACNC: <2 MIU/ML
BASOPHILS # BLD AUTO: 0.03 X10*3/UL (ref 0–0.1)
BASOPHILS NFR BLD AUTO: 0.3 %
BILIRUB SERPL-MCNC: 0.5 MG/DL (ref 0–1.2)
BILIRUB UR STRIP.AUTO-MCNC: NEGATIVE MG/DL
BUN SERPL-MCNC: 11 MG/DL (ref 6–23)
CALCIUM SERPL-MCNC: 9.3 MG/DL (ref 8.6–10.3)
CHLORIDE SERPL-SCNC: 107 MMOL/L (ref 98–107)
CO2 SERPL-SCNC: 29 MMOL/L (ref 21–32)
COLOR UR: YELLOW
CREAT SERPL-MCNC: 0.78 MG/DL (ref 0.5–1.05)
EGFRCR SERPLBLD CKD-EPI 2021: >90 ML/MIN/1.73M*2
EOSINOPHIL # BLD AUTO: 0.05 X10*3/UL (ref 0–0.7)
EOSINOPHIL NFR BLD AUTO: 0.4 %
ERYTHROCYTE [DISTWIDTH] IN BLOOD BY AUTOMATED COUNT: 12.7 % (ref 11.5–14.5)
GLUCOSE SERPL-MCNC: 119 MG/DL (ref 74–99)
GLUCOSE UR STRIP.AUTO-MCNC: NORMAL MG/DL
HCT VFR BLD AUTO: 40.4 % (ref 36–46)
HGB BLD-MCNC: 13.9 G/DL (ref 12–16)
IMM GRANULOCYTES # BLD AUTO: 0.06 X10*3/UL (ref 0–0.7)
IMM GRANULOCYTES NFR BLD AUTO: 0.5 % (ref 0–0.9)
KETONES UR STRIP.AUTO-MCNC: NEGATIVE MG/DL
LEUKOCYTE ESTERASE UR QL STRIP.AUTO: ABNORMAL
LYMPHOCYTES # BLD AUTO: 1.79 X10*3/UL (ref 1.2–4.8)
LYMPHOCYTES NFR BLD AUTO: 15.7 %
MAGNESIUM SERPL-MCNC: 1.92 MG/DL (ref 1.6–2.4)
MCH RBC QN AUTO: 29.3 PG (ref 26–34)
MCHC RBC AUTO-ENTMCNC: 34.4 G/DL (ref 32–36)
MCV RBC AUTO: 85 FL (ref 80–100)
MONOCYTES # BLD AUTO: 0.57 X10*3/UL (ref 0.1–1)
MONOCYTES NFR BLD AUTO: 5 %
MUCOUS THREADS #/AREA URNS AUTO: NORMAL /LPF
NEUTROPHILS # BLD AUTO: 8.92 X10*3/UL (ref 1.2–7.7)
NEUTROPHILS NFR BLD AUTO: 78.1 %
NITRITE UR QL STRIP.AUTO: NEGATIVE
NRBC BLD-RTO: 0 /100 WBCS (ref 0–0)
PH UR STRIP.AUTO: 7.5 [PH]
PLATELET # BLD AUTO: 306 X10*3/UL (ref 150–450)
POTASSIUM SERPL-SCNC: 4.4 MMOL/L (ref 3.5–5.3)
PROT SERPL-MCNC: 6.6 G/DL (ref 6.4–8.2)
PROT UR STRIP.AUTO-MCNC: ABNORMAL MG/DL
RBC # BLD AUTO: 4.75 X10*6/UL (ref 4–5.2)
RBC # UR STRIP.AUTO: NEGATIVE /UL
RBC #/AREA URNS AUTO: NORMAL /HPF
SODIUM SERPL-SCNC: 141 MMOL/L (ref 136–145)
SP GR UR STRIP.AUTO: 1.03
SQUAMOUS #/AREA URNS AUTO: NORMAL /HPF
UROBILINOGEN UR STRIP.AUTO-MCNC: ABNORMAL MG/DL
WBC # BLD AUTO: 11.4 X10*3/UL (ref 4.4–11.3)
WBC #/AREA URNS AUTO: NORMAL /HPF

## 2024-12-31 PROCEDURE — 81003 URINALYSIS AUTO W/O SCOPE: CPT

## 2024-12-31 PROCEDURE — 85025 COMPLETE CBC W/AUTO DIFF WBC: CPT

## 2024-12-31 PROCEDURE — 83735 ASSAY OF MAGNESIUM: CPT

## 2024-12-31 PROCEDURE — 99285 EMERGENCY DEPT VISIT HI MDM: CPT | Mod: 25 | Performed by: EMERGENCY MEDICINE

## 2024-12-31 PROCEDURE — 76830 TRANSVAGINAL US NON-OB: CPT | Performed by: RADIOLOGY

## 2024-12-31 PROCEDURE — 36415 COLL VENOUS BLD VENIPUNCTURE: CPT

## 2024-12-31 PROCEDURE — 87086 URINE CULTURE/COLONY COUNT: CPT | Mod: AHULAB

## 2024-12-31 PROCEDURE — 84702 CHORIONIC GONADOTROPIN TEST: CPT

## 2024-12-31 PROCEDURE — 80053 COMPREHEN METABOLIC PANEL: CPT

## 2024-12-31 PROCEDURE — 2550000001 HC RX 255 CONTRASTS: Performed by: EMERGENCY MEDICINE

## 2024-12-31 PROCEDURE — 76856 US EXAM PELVIC COMPLETE: CPT | Performed by: RADIOLOGY

## 2024-12-31 PROCEDURE — 74177 CT ABD & PELVIS W/CONTRAST: CPT

## 2024-12-31 PROCEDURE — 74177 CT ABD & PELVIS W/CONTRAST: CPT | Performed by: RADIOLOGY

## 2024-12-31 PROCEDURE — 76856 US EXAM PELVIC COMPLETE: CPT

## 2024-12-31 PROCEDURE — 2500000004 HC RX 250 GENERAL PHARMACY W/ HCPCS (ALT 636 FOR OP/ED)

## 2024-12-31 PROCEDURE — 93975 VASCULAR STUDY: CPT

## 2024-12-31 RX ORDER — ACETAMINOPHEN 500 MG
1000 TABLET ORAL EVERY 6 HOURS PRN
Qty: 30 TABLET | Refills: 0 | Status: SHIPPED | OUTPATIENT
Start: 2024-12-31 | End: 2025-01-10

## 2024-12-31 RX ORDER — KETOROLAC TROMETHAMINE 30 MG/ML
15 INJECTION, SOLUTION INTRAMUSCULAR; INTRAVENOUS ONCE
Status: COMPLETED | OUTPATIENT
Start: 2024-12-31 | End: 2024-12-31

## 2024-12-31 RX ORDER — ONDANSETRON 4 MG/1
4 TABLET, ORALLY DISINTEGRATING ORAL EVERY 8 HOURS PRN
Qty: 20 TABLET | Refills: 0 | Status: SHIPPED | OUTPATIENT
Start: 2024-12-31 | End: 2025-01-07

## 2024-12-31 RX ORDER — MORPHINE SULFATE 4 MG/ML
4 INJECTION, SOLUTION INTRAMUSCULAR; INTRAVENOUS ONCE
Status: COMPLETED | OUTPATIENT
Start: 2024-12-31 | End: 2024-12-31

## 2024-12-31 RX ADMIN — MORPHINE SULFATE 4 MG: 4 INJECTION, SOLUTION INTRAMUSCULAR; INTRAVENOUS at 14:10

## 2024-12-31 RX ADMIN — KETOROLAC TROMETHAMINE 15 MG: 30 INJECTION, SOLUTION INTRAMUSCULAR at 14:09

## 2024-12-31 RX ADMIN — IOHEXOL 75 ML: 350 INJECTION, SOLUTION INTRAVENOUS at 15:14

## 2024-12-31 ASSESSMENT — PAIN DESCRIPTION - PAIN TYPE: TYPE: ACUTE PAIN

## 2024-12-31 ASSESSMENT — PAIN SCALES - GENERAL
PAINLEVEL_OUTOF10: 8
PAINLEVEL_OUTOF10: 0 - NO PAIN
PAINLEVEL_OUTOF10: 4

## 2024-12-31 ASSESSMENT — PAIN DESCRIPTION - LOCATION
LOCATION: ABDOMEN
LOCATION: ABDOMEN

## 2024-12-31 NOTE — ED PROVIDER NOTES
Emergency Department Provider Note        History of Present Illness     History provided by: Patient  Limitations to History: None  External Records Reviewed with Brief Summary: OB/GYN follow-up on 12/17/2024 patient presented for pain with intercourse and irregular bleeding with current Nexplanon.  Had ultrasound of the pelvis which demonstrated findings concerning for PCOS.    HPI:  Cari Forde is a 24 y.o. female with prior history of chronic headaches presenting to the emergency department with pelvic/lower abdominal pain.  Patient states has been going on for the last few weeks, was seen at her OB/GYN on 12/17/2024 and an ultrasound demonstrating concerns of potential PCOS, and STD swabs which were unremarkable.  She has had a persistent intermittent pain previously, however the last 2 weeks has been constant pain which initially was worse with sexual intercourse, now worse with any type of movement.  Patient states that this feels somewhat improved when sitting on the toilet, no change with bowel movements.  She declines any fevers, chills, chest pain, shortness of breath, or any change in vaginal discharge or vaginal bleeding.  Notes she is currently still having mucoid/clear thick vaginal discharge.  No new sexual partners.    Physical Exam   Triage vitals:  T 36.7 °C (98.1 °F)  HR 84  /56  RR 20  O2 96 % None (Room air)    GEN:  A&Ox3, no acute distress, appears comfortable. Conversational and appropriate.    HEENT: Normocephalic, atraumatic. Conjunctiva pink with no redness or exudates. Hearing grossly intact. Moist mucous membranes.  CARDIO: Normal rate and regular rhythm. Normal S1, S2  without murmurs, rubs, or gallops.   PULM: Clear to auscultation bilaterally. No rales, rhonchi, or wheezes. No accessory muscle use or stridor.  GI: Soft, suprapubic tenderness to palpation, non-distended. No rebound tenderness or guarding.   SKIN: Warm and dry, no rashes, lesions, petechiae, or  purpura.  MSK: ROM intact in all 4 extremities without contractures or pain. No peripheral edema, contusions, or wounds.    NEURO: No focal findings identified. No confusion or gross mental status changes.  PSYCH: Appropriate mood and behavior, converses and responds appropriately during exam.    Medical Decision Making & ED Course   Medical Decision Makin y.o. female with prior history of chronic headaches presenting to the emergency department with pelvic/lower abdominal pain.  Her workup was overall unremarkable here aside from mildly elevated white blood cell count.  However urinalysis and ultrasound of the pelvis with Doppler did not note any concerning findings for ovarian torsion, or hemorrhagic ovarian cyst.  Beta-hCG was unremarkable, thus low suspicion for ectopic pregnancy.  Patient was offered a pelvic exam for concerns of potential pelvic inflammatory disease, however noted significant tenderness with transvaginal ultrasound therefore deferred.  Low suspicion for pelvic inflammatory disease given lack of fever, no prior history of STDs, recent negative STD testing, and no change in her vaginal discharge.  Overall, her OB/GYN is concern for potential PCOS, which I also have suspicion for.  Her pain was well-controlled with morphine and Toradol.  Will discharge home with Tylenol, ibuprofen, in addition to Zofran.  She is comfortable with this plan, and has follow-up scheduled with her OB/GYN on 2024, which she plans to attend.  Advised return to the emergency department should she have any additional issues.  She is comfortable with this plan.  Discharged stable condition.    ----      Differential diagnoses considered include but are not limited to: Ovarian torsion, ectopic pregnancy, hemorrhagic ovarian cyst, pelvic inflammatory disease     Social Determinants of Health which Significantly Impact Care: None identified     EKG Independent Interpretation: EKG not obtained    Independent Result  Review and Interpretation: Relevant laboratory and radiographic results were reviewed and independently interpreted by myself.  As necessary, they are commented on in the ED Course.    Chronic conditions affecting the patient's care: As documented above in [facility]    The patient was discussed with the following consultants/services: None    Care Considerations: As documented above in [facility]    ED Course:  ED Course as of 12/31/24 1731   Tue Dec 31, 2024   1431 US PELVIS TRANSABDOMINAL WITH TRANSVAGINAL  No torsion noted. [AD]   1434 Lab work with mild leukocytosis, and urinalysis with sterile pyuria. [AD]   1435 Will offer pelvic exam and CT abdomen/pelvis given patient's persistent pain. [AD]   1437 Declined pelvic given severe pain with transvaginal ultrasound.  No plan for CT abdomen/pelvis [AD]   1529 HCG, Beta-Quantitative: <2 [AD]   1529 MAGNESIUM: 1.92 [AD]   1529 Comprehensive metabolic panel(!)  unremarkable [AD]   1728 Reevaluation noted improvement of her symptoms.  [AD]   1728 CT abdomen pelvis w IV contrast  Noting nonobstructing left-sided nephrolithiasis, unlikely to be causing patient's suprapubic pain symptoms.  No additional findings noted. [AD]      ED Course User Index  [AD] Gino Ramirez DO         Diagnoses as of 12/31/24 1731   Pelvic pain in female   Generalized abdominal pain   Nausea     Disposition   As a result of the work-up, the patient was discharged home.  she was informed of her diagnosis and instructed to come back with any concerns or worsening of condition.  she and was agreeable to the plan as discussed above.  she was given the opportunity to ask questions.  All of the patient's questions were answered.    Procedures   Procedures    Patient seen and discussed with ED attending physician.    Gino Ramirez DO  Emergency Medicine       Gino Ramirez DO  Resident  12/31/24 1732

## 2024-12-31 NOTE — DISCHARGE INSTRUCTIONS
Please return to the emergency department, should you have any worsening symptoms, are unable to get an appointment with your primary care physician within the discussed time frame, or have any further concerns.     Please follow up with: Primary care physician as needed.  Please follow-up with OB/GYN as scheduled.  Discuss potential for PCOS and further management.    You may take ibuprofen or tylenol for pain. You may alternate ibuprofen and tylenol every 6 hours for better pain control.    Do not exceed 2400mg of ibuprofen or 4000mg of tylenol in a 24 hour period.

## 2024-12-31 NOTE — ED TRIAGE NOTES
PT TO ED VIA EMS FROM WORK FRO ABD PAIN. PT STATES THAT SHE HAS A HX OF OVARIAN CYSTS. PT STATES THAT THE PAIN MADE HER FEEL LIGHT HEADED AND SHAKY. PT HAD AN EPISODE OF N/V. PT DENIES CP AND NUMBNESS/TINGLING.

## 2025-01-01 LAB
BACTERIA UR CULT: NORMAL
HOLD SPECIMEN: NORMAL

## 2025-01-02 RX ORDER — DROSPIRENONE AND ETHINYL ESTRADIOL 0.02-3(28)
1 KIT ORAL DAILY
Qty: 84 TABLET | Refills: 3 | Status: SHIPPED | OUTPATIENT
Start: 2025-01-02 | End: 2026-01-02

## 2025-01-09 ENCOUNTER — TELEPHONE (OUTPATIENT)
Dept: OBSTETRICS AND GYNECOLOGY | Facility: CLINIC | Age: 25
End: 2025-01-09
Payer: COMMERCIAL

## 2025-01-09 NOTE — TELEPHONE ENCOUNTER
Per previous phone notes she has been prescribed estradiol pills without good results.  Next plan was to remove nexplanon.  She was offerred birth control pills to help with bleeding and she declined and stated she would like to let her hormones go naturally.  She was offered an appt this week for nexplanon removal and stated she could not do that appt and wanted to keep the appt on the 14th.  The nexplanon causing the irregular bleeding can be a side effect and we have tested her (with negative results) and tried all the medications (without good results) I am aware to try.  The next step is removal which she is scheduled for.

## 2025-01-09 NOTE — TELEPHONE ENCOUNTER
"Seen 12/17 for breakthrough bleeding. Not states \"if normal results Estradiol can be prescribed for patient\". Patient is scheduled for Nexplanon removal 1/14  "

## 2025-01-09 NOTE — TELEPHONE ENCOUNTER
Spoke with Cari, she is requesting something stronger than ibuprofen for cramping. States on a pain scale 1-10 it is a 7. We discuss ibuprofen and tylenol, heating pad and ice as needing. She was informed we can not prescribe anything stronger than ibuprofen. I did offer her an appointment Monday, there was a cancellation but due to work she has to leave her appointment for 1/14.  She was advised to go to ED if pain worsens. She agrees.

## 2025-01-09 NOTE — TELEPHONE ENCOUNTER
Patient called stating  she has been experiencing break threw bleeding says she has the Nexplanon. Stating she is experiencing cramping. Asking if there is anything she can take or can be sent to her pharmacy.

## 2025-01-14 ENCOUNTER — APPOINTMENT (OUTPATIENT)
Dept: OBSTETRICS AND GYNECOLOGY | Facility: CLINIC | Age: 25
End: 2025-01-14
Payer: COMMERCIAL

## 2025-01-14 VITALS
WEIGHT: 204 LBS | HEIGHT: 62 IN | BODY MASS INDEX: 37.54 KG/M2 | DIASTOLIC BLOOD PRESSURE: 72 MMHG | SYSTOLIC BLOOD PRESSURE: 140 MMHG

## 2025-01-14 DIAGNOSIS — Z30.46 NEXPLANON REMOVAL: ICD-10-CM

## 2025-01-14 DIAGNOSIS — N92.6 IRREGULAR MENSES: Primary | ICD-10-CM

## 2025-01-14 PROCEDURE — 11982 REMOVE DRUG IMPLANT DEVICE: CPT | Performed by: NURSE PRACTITIONER

## 2025-01-14 ASSESSMENT — ENCOUNTER SYMPTOMS
PSYCHIATRIC NEGATIVE: 1
GASTROINTESTINAL NEGATIVE: 1
CONSTITUTIONAL NEGATIVE: 1
RESPIRATORY NEGATIVE: 1

## 2025-01-14 NOTE — PROGRESS NOTES
Patient ID: Cari Forde is a 24 y.o. female.    Nexplanon Removal    Date/Time: 1/14/2025 4:27 PM    Performed by: RUDY Hernandez  Authorized by: RUDY Hernandez    Consent:     Consent obtained:  Written    Consent given by:  Patient    Procedural risks discussed:  Infection and bleeding    Patient questions answered: yes      Patient agrees, verbalizes understanding, and wants to proceed: yes      Educational handouts given: no      Instructions and paperwork completed: yes    Indication:     Indication: Presence of non-biodegradable drug delivery implant    Pre-procedure:     Pre-procedure timeout performed: yes      Local anesthetic:  Lidocaine 1%    The site was cleaned and prepped in a sterile fashion: yes    Procedure:     Procedure:  Removal    Small stab incision was made in arm: yes      Left/right:  Left    Site was closed with steri-strips and pressure bandage applied: yes      ProceduresSubjective   Patient ID: Cari Forde is a 24 y.o. female who presents for Nexplanon removal.  24 year old here for nexplanon removal.  Desires to remain without birth control x 1 month and then to have testing for PCOS.         Review of Systems   Constitutional: Negative.    Respiratory: Negative.     Gastrointestinal: Negative.    Genitourinary: Negative.    Skin: Negative.    Psychiatric/Behavioral: Negative.         Objective   Physical Exam  Vitals reviewed.   Constitutional:       Appearance: Normal appearance. She is well-developed.   Pulmonary:      Effort: Pulmonary effort is normal. No respiratory distress.   Abdominal:      Palpations: Abdomen is soft.   Musculoskeletal:         General: Normal range of motion.   Skin:     General: Skin is warm and dry.   Neurological:      General: No focal deficit present.      Mental Status: She is alert and oriented to person, place, and time. Mental status is at baseline.   Psychiatric:         Attention and Perception: Attention and  perception normal.         Mood and Affect: Mood and affect normal.         Speech: Speech normal.         Behavior: Behavior normal. Behavior is cooperative.         Thought Content: Thought content normal.         Judgment: Judgment normal.         Assessment/Plan   Problem List Items Addressed This Visit             ICD-10-CM    Nexplanon removal Z30.46     Other Visit Diagnoses         Codes    Irregular menses    -  Primary N92.6    Relevant Orders    Estradiol    Follicle Stimulating Hormone    Luteinizing Hormone    Glucose, Fasting    Insulin, Fasting    Hemoglobin A1C    TSH with reflex to Free T4 if abnormal    Prolactin        Nexplanon removed  Blood work ordered to be done in 1 months  Desires to start ocp after blood work  Follow up as needed         Nisha Gold, ROLO-CNP 01/14/25 4:16 PM

## 2025-01-24 ENCOUNTER — APPOINTMENT (OUTPATIENT)
Dept: PRIMARY CARE | Facility: CLINIC | Age: 25
End: 2025-01-24
Payer: COMMERCIAL

## 2025-02-07 ENCOUNTER — APPOINTMENT (OUTPATIENT)
Dept: PRIMARY CARE | Facility: CLINIC | Age: 25
End: 2025-02-07
Payer: COMMERCIAL

## 2025-02-07 VITALS
HEART RATE: 95 BPM | HEIGHT: 63 IN | DIASTOLIC BLOOD PRESSURE: 83 MMHG | SYSTOLIC BLOOD PRESSURE: 115 MMHG | WEIGHT: 209 LBS | OXYGEN SATURATION: 98 % | BODY MASS INDEX: 37.03 KG/M2

## 2025-02-07 DIAGNOSIS — K64.2 GRADE III HEMORRHOIDS: Primary | ICD-10-CM

## 2025-02-07 PROCEDURE — 3008F BODY MASS INDEX DOCD: CPT | Performed by: STUDENT IN AN ORGANIZED HEALTH CARE EDUCATION/TRAINING PROGRAM

## 2025-02-07 PROCEDURE — 1036F TOBACCO NON-USER: CPT | Performed by: STUDENT IN AN ORGANIZED HEALTH CARE EDUCATION/TRAINING PROGRAM

## 2025-02-07 PROCEDURE — 99213 OFFICE O/P EST LOW 20 MIN: CPT | Performed by: STUDENT IN AN ORGANIZED HEALTH CARE EDUCATION/TRAINING PROGRAM

## 2025-02-07 RX ORDER — DOCUSATE SODIUM 100 MG/1
100 CAPSULE, LIQUID FILLED ORAL 2 TIMES DAILY
Qty: 60 CAPSULE | Refills: 0 | Status: SHIPPED | OUTPATIENT
Start: 2025-02-07

## 2025-02-07 RX ORDER — LIDOCAINE HYDROCHLORIDE AND HYDROCORTISONE ACETATE 30; 5 MG/G; MG/G
1 CREAM RECTAL 2 TIMES DAILY PRN
Qty: 7 G | Refills: 1 | Status: SHIPPED | OUTPATIENT
Start: 2025-02-07 | End: 2025-02-14

## 2025-02-07 ASSESSMENT — ENCOUNTER SYMPTOMS
ABDOMINAL PAIN: 0
SHORTNESS OF BREATH: 0
ANAL BLEEDING: 1
DEPRESSION: 0
LOSS OF SENSATION IN FEET: 0
OCCASIONAL FEELINGS OF UNSTEADINESS: 0
FEVER: 0

## 2025-02-07 ASSESSMENT — COLUMBIA-SUICIDE SEVERITY RATING SCALE - C-SSRS
2. HAVE YOU ACTUALLY HAD ANY THOUGHTS OF KILLING YOURSELF?: NO
1. IN THE PAST MONTH, HAVE YOU WISHED YOU WERE DEAD OR WISHED YOU COULD GO TO SLEEP AND NOT WAKE UP?: NO
6. HAVE YOU EVER DONE ANYTHING, STARTED TO DO ANYTHING, OR PREPARED TO DO ANYTHING TO END YOUR LIFE?: NO

## 2025-02-07 ASSESSMENT — PATIENT HEALTH QUESTIONNAIRE - PHQ9
2. FEELING DOWN, DEPRESSED OR HOPELESS: NOT AT ALL
SUM OF ALL RESPONSES TO PHQ9 QUESTIONS 1 AND 2: 0
1. LITTLE INTEREST OR PLEASURE IN DOING THINGS: NOT AT ALL

## 2025-02-07 NOTE — PATIENT INSTRUCTIONS
"How do I care for myself at home?   If you have h?m?rrh?id?, your doctor or nurse can suggest treatments. But there are some steps you can try on your own first.  The most important thing you can do is try to prevent ????tip?ti?n and keep your bowel movements soft. You should have a bowel movement at least a few times a week.  Here are some steps you can take:  ?Eat lots of fruits, vegetables, and other foods with fiber. Fiber helps increase bowel movements.  You need 20 to 35 grams of fiber a day to keep your bowel movements regular. If you do not get enough fiber from your diet, you can take fiber supplements. These come as powders, wafers, or pills. They include psyllium seed (sample brand names: Metamucil, Konsyl), methylcellulose (sample brand name: Citrucel), polycarbophil (sample brand name: FiberCon), and wheat dextrin (sample brand name: Benefiber). If you take a fiber supplement, read the label so you know how much to take. If you're not sure, ask your doctor nurse.  ?Drink plenty of water and other fluids. This is especially important if you take a fiber supplement.  ?Limit fatty foods and alcohol. These can make ????ti??louis? worse.  ?Take medicines called \"stool softeners\" such as docusate sodium (sample brand names: Colace, Dulcolax). These increase the number of bowel movements you have. They are safe and can prevent problems later.  ?Take your time when having a bowel movement. But do not spend too much time on the toilet (for example, reading). Also, try not to push hard or strain when having a bowel movement.  ?Get regular physical activity. Even gentle forms of exercise, like walking, are good for your health.  To relieve symptoms of h?m?rrh?ids, you can:  ?Take sitz baths - This means soaking your buttocks in 2 or 3 inches of warm water. You can do this up to 2 to 3 times a day for 10 to 15 minutes. Do not add soap, bubble bath, or anything to the water.  ?Try non-prescription medicines - You can " "find these in a pharmacy. They include creams or ointments you rub on your anus to relieve pain, itching, and swelling. Some hemorrhoid medicines come in a capsule (called a \"suppository\") you put inside your rectum. Others come in a cream that comes in a bottle with a nozzle you put inside your rectum.  Do not use medicines that have hydrocortisone (a steroid medicine) for more than a week, unless your doctor or nurse says to.  Are there other treatments for hemorrhoids?   Yes. If you still have symptoms after trying the steps listed above, you might need treatments to destroy or remove the h?m?rrh?ids.  One popular treatment for h?m?rrh?i?s inside the rectum is called \"rubber band ligation.\" The doctor ties tiny rubber bands around the h?m?rrh?id?. A few days later, the h?m?rrh?i?s shrink and stop bleeding. Doctors can also use lasers, heat, or chemicals to destroy h?m?rrhoi??. These procedures can only treat internal h?m?rrhoid?.  If none of these options works, your doctor might suggest s?rgery to remove or tie off the blood vessels of the h?m?rrh?ids. External h?m?rrh?ids can only be removed with ?urgery.  When should I call my doctor or nurse?   Call for advice if:  ?You have new or increased bleeding from your rectum.  ?You have tissue sticking out from your rectum that you can't push back in.  ?You cannot have a bowel movement or urinate because of pain.  ?Your symptoms are getting worse even with home care.  ?You have a fever of 100.4°F (38°C) or higher.  "

## 2025-02-07 NOTE — PROGRESS NOTES
"Patient Name:  Cari Forde  MRN:  [id number]  :  2000    Subjective   Patient ID: Cari Forde is a 24 y.o. female who presents for Acute Visit (Pt here for a possible hemorroid, bright red blood sometimes in the toilet mostly when she wipes. Itching and burning. ).    HPI     25 yo female presents with hemorrhoid concerns    Concern off and on for about 2 years but in the last month more uncomfortable and bleeding when wiping    Fiber  Having bowel movements twice daily, not hard   Straining     Review of Systems   Constitutional:  Negative for fever.   Respiratory:  Negative for shortness of breath.    Cardiovascular:  Negative for chest pain.   Gastrointestinal:  Positive for anal bleeding. Negative for abdominal pain.       Objective   /83 (BP Location: Right arm, Patient Position: Sitting)   Pulse 95   Ht 1.6 m (5' 3\")   Wt 94.8 kg (209 lb)   SpO2 98%   BMI 37.02 kg/m²     Physical Exam  Constitutional:       Appearance: Normal appearance.   HENT:      Head: Normocephalic and atraumatic.   Cardiovascular:      Rate and Rhythm: Normal rate.   Pulmonary:      Effort: No respiratory distress.   Genitourinary:         Comments: Grade III hemorrhoid   Neurological:      Mental Status: She is alert and oriented to person, place, and time.   Psychiatric:         Mood and Affect: Mood normal.         Behavior: Behavior normal.     Assessment/Plan   Problem List Items Addressed This Visit    None  Visit Diagnoses         Codes    Grade III hemorrhoids    -  Primary K64.2    Relevant Medications    docusate sodium (Colace) 100 mg capsule    lidocaine HCl-hydrocortison ac 3-0.5 % cream          How do I care for myself at home?   If you have h?m?rrh?id?, your doctor or nurse can suggest treatments. But there are some steps you can try on your own first.  The most important thing you can do is try to prevent ????tip?ti?n and keep your bowel movements soft. You should have a bowel movement at least " "a few times a week.  Here are some steps you can take:  ?Eat lots of fruits, vegetables, and other foods with fiber. Fiber helps increase bowel movements.  You need 20 to 35 grams of fiber a day to keep your bowel movements regular. If you do not get enough fiber from your diet, you can take fiber supplements. These come as powders, wafers, or pills. They include psyllium seed (sample brand names: Metamucil, Konsyl), methylcellulose (sample brand name: Citrucel), polycarbophil (sample brand name: FiberCon), and wheat dextrin (sample brand name: Benefiber). If you take a fiber supplement, read the label so you know how much to take. If you're not sure, ask your doctor nurse.  ?Drink plenty of water and other fluids. This is especially important if you take a fiber supplement.  ?Limit fatty foods and alcohol. These can make ????ti??louis? worse.  ?Take medicines called \"stool softeners\" such as docusate sodium (sample brand names: Colace, Dulcolax). These increase the number of bowel movements you have. They are safe and can prevent problems later.  ?Take your time when having a bowel movement. But do not spend too much time on the toilet (for example, reading). Also, try not to push hard or strain when having a bowel movement.  ?Get regular physical activity. Even gentle forms of exercise, like walking, are good for your health.  To relieve symptoms of h?m?rrh?ids, you can:  ?Take sitz baths - This means soaking your buttocks in 2 or 3 inches of warm water. You can do this up to 2 to 3 times a day for 10 to 15 minutes. Do not add soap, bubble bath, or anything to the water.  ?Try non-prescription medicines - You can find these in a pharmacy. They include creams or ointments you rub on your anus to relieve pain, itching, and swelling. Some hemorrhoid medicines come in a capsule (called a \"suppository\") you put inside your rectum. Others come in a cream that comes in a bottle with a nozzle you put inside your rectum.  Do " "not use medicines that have hydrocortisone (a steroid medicine) for more than a week, unless your doctor or nurse says to.  Are there other treatments for hemorrhoids?   Yes. If you still have symptoms after trying the steps listed above, you might need treatments to destroy or remove the h?m?rrh?ids.  One popular treatment for h?m?rrh?i?s inside the rectum is called \"rubber band ligation.\" The doctor ties tiny rubber bands around the h?m?rrh?id?. A few days later, the h?m?rrh?i?s shrink and stop bleeding. Doctors can also use lasers, heat, or chemicals to destroy h?m?rrhoi??. These procedures can only treat internal h?m?rrhoid?.  If none of these options works, your doctor might suggest s?rgery to remove or tie off the blood vessels of the h?m?rrh?ids. External h?m?rrh?ids can only be removed with ?urgery.  When should I call my doctor or nurse?   Call for advice if:  ?You have new or increased bleeding from your rectum.  ?You have tissue sticking out from your rectum that you can't push back in.  ?You cannot have a bowel movement or urinate because of pain.  ?Your symptoms are getting worse even with home care.  ?You have a fever of 100.4°F (38°C) or higher.  "

## 2025-02-12 ENCOUNTER — PATIENT MESSAGE (OUTPATIENT)
Dept: PRIMARY CARE | Facility: CLINIC | Age: 25
End: 2025-02-12
Payer: COMMERCIAL

## 2025-02-12 DIAGNOSIS — K64.2 GRADE III HEMORRHOIDS: Primary | ICD-10-CM

## 2025-02-19 PROCEDURE — RXMED WILLOW AMBULATORY MEDICATION CHARGE

## 2025-02-22 LAB
EST. AVERAGE GLUCOSE BLD GHB EST-MCNC: 111 MG/DL
EST. AVERAGE GLUCOSE BLD GHB EST-SCNC: 6.2 MMOL/L
ESTRADIOL SERPL-MCNC: 37 PG/ML
FSH SERPL-ACNC: 4.9 MIU/ML
GLUCOSE P FAST SERPL-MCNC: 100 MG/DL (ref 65–99)
HBA1C MFR BLD: 5.5 % OF TOTAL HGB
INSULIN SERPL-ACNC: NORMAL U[IU]/ML
LH SERPL-ACNC: 5.5 MIU/ML
PROLACTIN SERPL-MCNC: 4.3 NG/ML
TSH SERPL-ACNC: 1.52 MIU/L

## 2025-02-24 ENCOUNTER — PHARMACY VISIT (OUTPATIENT)
Dept: PHARMACY | Facility: CLINIC | Age: 25
End: 2025-02-24
Payer: MEDICAID

## 2025-02-24 ENCOUNTER — PATIENT MESSAGE (OUTPATIENT)
Dept: OBSTETRICS AND GYNECOLOGY | Facility: CLINIC | Age: 25
End: 2025-02-24
Payer: COMMERCIAL

## 2025-02-24 DIAGNOSIS — E16.1 INCREASED INSULIN LEVEL: Primary | ICD-10-CM

## 2025-02-24 LAB
EST. AVERAGE GLUCOSE BLD GHB EST-MCNC: 111 MG/DL
EST. AVERAGE GLUCOSE BLD GHB EST-SCNC: 6.2 MMOL/L
ESTRADIOL SERPL-MCNC: 37 PG/ML
FSH SERPL-ACNC: 4.9 MIU/ML
GLUCOSE P FAST SERPL-MCNC: 100 MG/DL (ref 65–99)
HBA1C MFR BLD: 5.5 % OF TOTAL HGB
INSULIN SERPL-ACNC: 20.6 UIU/ML
LH SERPL-ACNC: 5.5 MIU/ML
PROLACTIN SERPL-MCNC: 4.3 NG/ML
TSH SERPL-ACNC: 1.52 MIU/L

## 2025-02-24 PROCEDURE — RXMED WILLOW AMBULATORY MEDICATION CHARGE

## 2025-02-24 RX ORDER — METFORMIN HYDROCHLORIDE 500 MG/1
500 TABLET ORAL
Qty: 180 TABLET | Refills: 3 | Status: SHIPPED | OUTPATIENT
Start: 2025-02-24 | End: 2025-02-24

## 2025-02-24 RX ORDER — ESCITALOPRAM OXALATE 20 MG/1
20 TABLET ORAL DAILY
Qty: 90 TABLET | Refills: 3 | Status: SHIPPED | OUTPATIENT
Start: 2025-02-24 | End: 2026-02-24

## 2025-02-24 RX ORDER — METFORMIN HYDROCHLORIDE 500 MG/1
500 TABLET ORAL
Qty: 180 TABLET | Refills: 3 | Status: SHIPPED | OUTPATIENT
Start: 2025-02-24 | End: 2026-02-24

## 2025-02-27 ENCOUNTER — PHARMACY VISIT (OUTPATIENT)
Dept: PHARMACY | Facility: CLINIC | Age: 25
End: 2025-02-27
Payer: MEDICAID

## 2025-03-03 PROCEDURE — RXMED WILLOW AMBULATORY MEDICATION CHARGE

## 2025-03-06 ENCOUNTER — PHARMACY VISIT (OUTPATIENT)
Dept: PHARMACY | Facility: CLINIC | Age: 25
End: 2025-03-06
Payer: MEDICAID

## 2025-03-06 ENCOUNTER — OFFICE VISIT (OUTPATIENT)
Dept: PRIMARY CARE | Facility: CLINIC | Age: 25
End: 2025-03-06
Payer: COMMERCIAL

## 2025-03-06 VITALS
OXYGEN SATURATION: 99 % | WEIGHT: 207 LBS | HEIGHT: 63 IN | HEART RATE: 91 BPM | SYSTOLIC BLOOD PRESSURE: 108 MMHG | DIASTOLIC BLOOD PRESSURE: 68 MMHG | BODY MASS INDEX: 36.68 KG/M2

## 2025-03-06 DIAGNOSIS — G44.229 CHRONIC TENSION-TYPE HEADACHE, NOT INTRACTABLE: Primary | Chronic | ICD-10-CM

## 2025-03-06 PROCEDURE — 1036F TOBACCO NON-USER: CPT

## 2025-03-06 PROCEDURE — 99213 OFFICE O/P EST LOW 20 MIN: CPT

## 2025-03-06 PROCEDURE — 3008F BODY MASS INDEX DOCD: CPT

## 2025-03-06 PROCEDURE — RXMED WILLOW AMBULATORY MEDICATION CHARGE

## 2025-03-06 RX ORDER — AMITRIPTYLINE HYDROCHLORIDE 25 MG/1
25 TABLET, FILM COATED ORAL NIGHTLY
Qty: 30 TABLET | Refills: 11 | Status: SHIPPED | OUTPATIENT
Start: 2025-03-06 | End: 2026-03-06

## 2025-03-06 ASSESSMENT — ENCOUNTER SYMPTOMS
HEADACHES: 1
LOSS OF SENSATION IN FEET: 0
DEPRESSION: 0
OCCASIONAL FEELINGS OF UNSTEADINESS: 0

## 2025-03-06 ASSESSMENT — COLUMBIA-SUICIDE SEVERITY RATING SCALE - C-SSRS
6. HAVE YOU EVER DONE ANYTHING, STARTED TO DO ANYTHING, OR PREPARED TO DO ANYTHING TO END YOUR LIFE?: NO
2. HAVE YOU ACTUALLY HAD ANY THOUGHTS OF KILLING YOURSELF?: NO
1. IN THE PAST MONTH, HAVE YOU WISHED YOU WERE DEAD OR WISHED YOU COULD GO TO SLEEP AND NOT WAKE UP?: NO

## 2025-03-06 ASSESSMENT — PATIENT HEALTH QUESTIONNAIRE - PHQ9
SUM OF ALL RESPONSES TO PHQ9 QUESTIONS 1 AND 2: 0
1. LITTLE INTEREST OR PLEASURE IN DOING THINGS: NOT AT ALL
2. FEELING DOWN, DEPRESSED OR HOPELESS: NOT AT ALL

## 2025-03-06 NOTE — ASSESSMENT & PLAN NOTE
Daily migraines and dizziness, eyes and ears. Headaches tend to come mid day. 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 Has only had n/v once from a headache. Takes tylenol and ibuprofen. Uses blue light glasses daily at work. Endorse water intake of 4 stanleys, states eating is regular.   Has not had vision checked.  Started a year ago, Was given muscle relaxer's and meloxicam for then thinking tension headaches and they have not helped.   Will start amitriptyline nightly follow up 3 months.

## 2025-03-06 NOTE — PROGRESS NOTES
"Subjective   Patient ID: Cari Forde is a 24 y.o. female who presents for Follow-up (Discuss  headaches /(Muscle relaxer's are not helping)).    Past Medical, Surgical, and Family History reviewed and updated in chart.     Reviewed all medications by prescribing practitioner or clinical pharmacist (such as prescriptions, OTCs, herbal therapies and supplements) and documented in the medical record.    HPI   24 yof in office for headaches.    Daily migraines and dizziness, eyes and ears. Headaches tend to come mid day. 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 Has only had n/v once from a headache. Takes tylenol and ibuprofen. Uses blue light glasses daily at work. Endorse water intake of 4 stanleys, states eating is regular.   Has not had vision checked.  Started a year ago, Was given muscle relaxer's and meloxicam for then thinking tension headaches and they have not helped.     Review of Systems   Neurological:  Positive for headaches.       Objective   /68   Pulse 91   Ht 1.6 m (5' 3\")   Wt 93.9 kg (207 lb)   SpO2 99%   BMI 36.67 kg/m²     Physical Exam  Constitutional:       Appearance: Normal appearance.   Cardiovascular:      Rate and Rhythm: Normal rate and regular rhythm.   Pulmonary:      Effort: Pulmonary effort is normal.      Breath sounds: Normal breath sounds.   Neurological:      Mental Status: She is alert and oriented to person, place, and time.         Assessment/Plan   Problem List Items Addressed This Visit       Chronic tension-type headache, not intractable - Primary (Chronic)     Daily migraines and dizziness, eyes and ears. Headaches tend to come mid day. 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 Has only had n/v once from a headache. Takes tylenol and ibuprofen. Uses blue light glasses " daily at work. Endorse water intake of 4 stanleys, states eating is regular.   Has not had vision checked.  Started a year ago, Was given muscle relaxer's and meloxicam for then thinking tension headaches and they have not helped.   Will start amitriptyline nightly follow up 3 months.          Relevant Medications    amitriptyline (Elavil) 25 mg tablet    Other Relevant Orders    Follow Up In [facility] - PCP - Established

## 2025-03-07 ENCOUNTER — APPOINTMENT (OUTPATIENT)
Dept: SURGERY | Facility: CLINIC | Age: 25
End: 2025-03-07
Payer: COMMERCIAL

## 2025-03-07 VITALS
HEIGHT: 63 IN | HEART RATE: 104 BPM | SYSTOLIC BLOOD PRESSURE: 109 MMHG | BODY MASS INDEX: 36.18 KG/M2 | WEIGHT: 204.2 LBS | DIASTOLIC BLOOD PRESSURE: 74 MMHG | OXYGEN SATURATION: 96 %

## 2025-03-07 DIAGNOSIS — K64.8 INTERNAL AND EXTERNAL BLEEDING HEMORRHOIDS: Primary | ICD-10-CM

## 2025-03-07 DIAGNOSIS — K64.4 INTERNAL AND EXTERNAL BLEEDING HEMORRHOIDS: Primary | ICD-10-CM

## 2025-03-07 DIAGNOSIS — K59.00 CONSTIPATION, UNSPECIFIED CONSTIPATION TYPE: ICD-10-CM

## 2025-03-07 ASSESSMENT — ENCOUNTER SYMPTOMS
HEADACHES: 0
COUGH: 0
CHILLS: 0
DIZZINESS: 0
FEVER: 0
PALPITATIONS: 0
CONSTIPATION: 1
NAUSEA: 0
DIARRHEA: 0
VOMITING: 0
ANAL BLEEDING: 1
ABDOMINAL PAIN: 0
SHORTNESS OF BREATH: 0
BLOOD IN STOOL: 0

## 2025-03-07 NOTE — PROGRESS NOTES
GENERAL SURGERY CLINIC NOTE    Cari Forde   2000   [id number]     History Of Present Illness  Cari Forde is a 24 y.o. female who presents to the office for evaluation of hemorrhoids. She has had them for at least 2 years, but have been bothering her lately with bleeding and irritation. She has 1-2 bowel movements a day, but strains and the stool is quite firm. She has used hemorrhoid ointment, colace and topical lidocaine without significant improvement. She eats plenty of fiber in her diet.     Past Medical History  She has a past medical history of Depression and Kidney stone (9/22/23).    Surgical History  She has a past surgical history that includes [place] tooth extraction.    Medications  Current Outpatient Medications on File Prior to Visit   Medication Sig Dispense Refill    amitriptyline (Elavil) 25 mg tablet Take 1 tablet (25 mg) by mouth once daily at bedtime. 30 tablet 11    docusate sodium (Colace) 100 mg capsule Take 1 capsule (100 mg) by mouth 2 times a day. 60 capsule 0    escitalopram (Lexapro) 20 mg tablet Take 1 tablet (20 mg) by mouth once daily. 90 tablet 3    lidocaine HCl-hydrocortison ac 3-0.5 % cream Insert 1 application into the rectum twice daily as needed for up to 7 days 28.35 g 0    magnesium glycinate 100 mg magnesium capsule Take 1 capsule (100 mg) by mouth once daily. 100 capsule 3    metFORMIN  mg 24 hr tablet Take 1 tablet (500 mg) by mouth once daily in the evening. Take with meals. Do not crush, chew, or split. 30 tablet 11    riboflavin (vitamin B2) 100 mg tablet tablet Take 1 tablet (100 mg) by mouth once daily. 100 tablet 3    [DISCONTINUED] drospirenone-ethinyl estradiol (Dinah) 3-0.02 mg tablet Take 1 tablet by mouth daily 84 tablet 2    [DISCONTINUED] drospirenone-ethinyl estradiol (Dinah, 28,) 3-0.02 mg tablet Take 1 tablet by mouth once daily. (Patient not taking: Reported on 2/7/2025) 84 tablet 3    [DISCONTINUED] meloxicam (Mobic) 15 mg tablet Take 1  "tablet (15 mg) by mouth once daily as needed for moderate pain (4 - 6). (Patient not taking: Reported on 3/6/2025) 30 tablet 11    [DISCONTINUED] metFORMIN (Glucophage) 500 mg tablet Take 1 tablet (500 mg) by mouth 2 times daily (morning and late afternoon). 180 tablet 3    [DISCONTINUED] riboflavin (vitamin B2) 100 mg tablet tablet Take 1 tablet (100 mg) by mouth once daily. 100 tablet 3     No current facility-administered medications on file prior to visit.       Allergies  Patient has no known allergies.     Social History  She reports that she has never smoked. She has never used smokeless tobacco. She reports that she does not currently use alcohol after a past usage of about 4.0 standard drinks of alcohol per week. She reports that she does not use drugs.    Family History  Family History   Problem Relation Name Age of Onset    Hypertension Mother Lidia Forde     Breast cancer Mother Lidia Forde     Diabetes Maternal Grandmother Gabby     Hypertension Maternal Grandmother Gabby     Breast cancer Maternal Grandmother Gabby         Review of Systems   Constitutional:  Negative for chills and fever.   Respiratory:  Negative for cough and shortness of breath.    Cardiovascular:  Negative for chest pain and palpitations.   Gastrointestinal:  Positive for anal bleeding and constipation. Negative for abdominal pain, blood in stool, diarrhea, nausea and vomiting.   Neurological:  Negative for dizziness and headaches.   All other systems reviewed and are negative.      Last Recorded Vitals  Blood pressure 109/74, pulse 104, height 1.6 m (5' 3\"), weight 92.6 kg (204 lb 3.2 oz), SpO2 96%, not currently breastfeeding.     Physical Exam  Constitutional:       General: She is not in acute distress.     Appearance: Normal appearance. She is not ill-appearing.   HENT:      Head: Normocephalic and atraumatic.   Cardiovascular:      Rate and Rhythm: Normal rate and regular rhythm.   Pulmonary:      Effort: Pulmonary effort " is normal. No respiratory distress.      Breath sounds: Normal breath sounds.   Genitourinary:     Comments: The examination was performed in the presence of a chaperone.  External exam: small, nonthrombosed hemorrhoid that is external in origin in the left lateral position, no other significant external hemorrhoids  ARTI: normal rectal tone, normal mucosa  Anoscopy: small internal hemorrhoid in the left lateral position  Musculoskeletal:         General: No swelling.   Skin:     General: Skin is warm and dry.   Neurological:      Mental Status: She is alert and oriented to person, place, and time. Mental status is at baseline.   Psychiatric:         Mood and Affect: Mood normal.         Behavior: Behavior normal.          Relevant Results    No results found for this or any previous visit (from the past 24 hours).    No results found.    Assessment and Plan  24 y.o. female with a symptomatic left lateral external hemorrhoid in the setting of straining with bowel movements. I discussed that improving her straining/constipation first is important, as this hemorrhoid may improve enough where she becomes largely asymptomatic. I discussed starting with Metamucil 2-3 times a day and considering Miralax if needed. Follow up in 2 months to discuss her symptoms. If they persist despite her constipation being well managed, we can consider an external examination and discuss surgery at that time. She expressed her understanding and all questions were answered.     Sarah Ingiuez MD, FACS  General Surgery

## 2025-03-11 DIAGNOSIS — G44.229 CHRONIC TENSION-TYPE HEADACHE, NOT INTRACTABLE: Primary | Chronic | ICD-10-CM

## 2025-03-12 PROCEDURE — RXMED WILLOW AMBULATORY MEDICATION CHARGE

## 2025-03-13 ENCOUNTER — PHARMACY VISIT (OUTPATIENT)
Dept: PHARMACY | Facility: CLINIC | Age: 25
End: 2025-03-13
Payer: MEDICAID

## 2025-03-18 ENCOUNTER — TELEPHONE (OUTPATIENT)
Dept: OBSTETRICS AND GYNECOLOGY | Facility: CLINIC | Age: 25
End: 2025-03-18
Payer: COMMERCIAL

## 2025-03-18 NOTE — TELEPHONE ENCOUNTER
Attempted to contact patient to offer her an appointment 3/19 at 1140 am for breast check. Call went to Voicemail. Informed patient to call office to schedule.

## 2025-03-19 ENCOUNTER — OFFICE VISIT (OUTPATIENT)
Dept: OBSTETRICS AND GYNECOLOGY | Facility: CLINIC | Age: 25
End: 2025-03-19
Payer: COMMERCIAL

## 2025-03-19 VITALS
WEIGHT: 204 LBS | DIASTOLIC BLOOD PRESSURE: 60 MMHG | HEIGHT: 62 IN | BODY MASS INDEX: 37.54 KG/M2 | SYSTOLIC BLOOD PRESSURE: 104 MMHG

## 2025-03-19 DIAGNOSIS — R23.4 BREAST SKIN CHANGES: ICD-10-CM

## 2025-03-19 DIAGNOSIS — N64.4 BREAST PAIN, LEFT: Primary | ICD-10-CM

## 2025-03-19 DIAGNOSIS — Z80.3 FAMILY HISTORY OF BREAST CANCER IN MOTHER: ICD-10-CM

## 2025-03-19 PROCEDURE — 99214 OFFICE O/P EST MOD 30 MIN: CPT | Performed by: NURSE PRACTITIONER

## 2025-03-19 PROCEDURE — 1036F TOBACCO NON-USER: CPT | Performed by: NURSE PRACTITIONER

## 2025-03-19 PROCEDURE — 3008F BODY MASS INDEX DOCD: CPT | Performed by: NURSE PRACTITIONER

## 2025-03-19 ASSESSMENT — ENCOUNTER SYMPTOMS
COLOR CHANGE: 1
PSYCHIATRIC NEGATIVE: 1
RESPIRATORY NEGATIVE: 1
GASTROINTESTINAL NEGATIVE: 1
CONSTITUTIONAL NEGATIVE: 1

## 2025-03-19 NOTE — PROGRESS NOTES
"Subjective   Patient ID: Cari Forde is a 24 y.o. female who presents for Follow-up (Patient complains of pain in left breast that started one week ago. Yesterday she noticed \" discoloration \" on breast. ).  24 year old here for complaints of having left breast pain, skin changes.  She notes that about 1 week ago she started having pain in the upper and lower part of her breast.  Then 2 days ago the skin in the lower area of the left breast changed color.  The skin in the lower inner quadrant now is brown.  She denies any peeling, redness and dimpling in the area of the skin change.  She denies any nipple discharge.  She denies any lumps felt in the areas of the pain.  The pain is a sharp pain that will come randomly.  The pain has been daily.  No change in the pain since first noticed 1 week ago.  She has a family history of breast cancer in her maternal grandmother and mom.         Review of Systems   Constitutional: Negative.    Respiratory: Negative.     Gastrointestinal: Negative.    Genitourinary:         Breast pain, breast skin change   Skin:  Positive for color change.   Psychiatric/Behavioral: Negative.         Objective   Physical Exam  Vitals reviewed.   Constitutional:       Appearance: Normal appearance. She is well-developed.   Pulmonary:      Effort: Pulmonary effort is normal. No respiratory distress.   Chest:   Breasts:     Breasts are symmetrical.      Right: Normal. No swelling, bleeding, inverted nipple, mass, nipple discharge, skin change or tenderness.      Left: Skin change and tenderness present. No swelling, bleeding, inverted nipple, mass or nipple discharge.       Abdominal:      Palpations: Abdomen is soft.   Lymphadenopathy:      Upper Body:      Right upper body: No supraclavicular, axillary or pectoral adenopathy.      Left upper body: No supraclavicular, axillary or pectoral adenopathy.   Skin:     General: Skin is warm and dry.   Neurological:      General: No focal deficit " present.      Mental Status: She is alert and oriented to person, place, and time. Mental status is at baseline.   Psychiatric:         Attention and Perception: Attention and perception normal.         Mood and Affect: Mood and affect normal.         Speech: Speech normal.         Behavior: Behavior normal. Behavior is cooperative.         Thought Content: Thought content normal.         Judgment: Judgment normal.         Assessment/Plan   Problem List Items Addressed This Visit    None  Visit Diagnoses         Codes    Breast pain, left    -  Primary N64.4    Relevant Orders    BI US breast limited left    Breast skin changes     R23.4    Relevant Orders    BI US breast limited left    Family history of breast cancer in mother     Z80.3    Relevant Orders    BI US breast limited left        Left breast ultrasound ordered  Will treat pending results if needed  Follow up as needed         ROLO Hernandez-CNP 03/19/25 11:42 AM

## 2025-03-20 ENCOUNTER — HOSPITAL ENCOUNTER (OUTPATIENT)
Dept: RADIOLOGY | Facility: HOSPITAL | Age: 25
Discharge: HOME | End: 2025-03-20
Payer: COMMERCIAL

## 2025-03-20 DIAGNOSIS — R23.4 BREAST SKIN CHANGES: ICD-10-CM

## 2025-03-20 DIAGNOSIS — Z80.3 FAMILY HISTORY OF BREAST CANCER IN MOTHER: ICD-10-CM

## 2025-03-20 DIAGNOSIS — N64.4 BREAST PAIN, LEFT: ICD-10-CM

## 2025-03-20 PROCEDURE — 76642 ULTRASOUND BREAST LIMITED: CPT | Mod: LT

## 2025-03-26 ENCOUNTER — PATIENT MESSAGE (OUTPATIENT)
Dept: OBSTETRICS AND GYNECOLOGY | Facility: CLINIC | Age: 25
End: 2025-03-26
Payer: COMMERCIAL

## 2025-03-31 ENCOUNTER — APPOINTMENT (OUTPATIENT)
Dept: SURGERY | Facility: CLINIC | Age: 25
End: 2025-03-31
Payer: COMMERCIAL

## 2025-03-31 VITALS
HEIGHT: 62 IN | BODY MASS INDEX: 37.45 KG/M2 | OXYGEN SATURATION: 97 % | WEIGHT: 203.5 LBS | DIASTOLIC BLOOD PRESSURE: 76 MMHG | HEART RATE: 80 BPM | SYSTOLIC BLOOD PRESSURE: 107 MMHG

## 2025-03-31 DIAGNOSIS — K59.00 CONSTIPATION, UNSPECIFIED CONSTIPATION TYPE: ICD-10-CM

## 2025-03-31 DIAGNOSIS — K64.8 INTERNAL AND EXTERNAL BLEEDING HEMORRHOIDS: Primary | ICD-10-CM

## 2025-03-31 DIAGNOSIS — K64.4 INTERNAL AND EXTERNAL BLEEDING HEMORRHOIDS: Primary | ICD-10-CM

## 2025-03-31 PROCEDURE — 99214 OFFICE O/P EST MOD 30 MIN: CPT | Performed by: SURGERY

## 2025-03-31 PROCEDURE — 1036F TOBACCO NON-USER: CPT | Performed by: SURGERY

## 2025-03-31 PROCEDURE — 3008F BODY MASS INDEX DOCD: CPT | Performed by: SURGERY

## 2025-03-31 ASSESSMENT — ENCOUNTER SYMPTOMS
CHILLS: 0
BLOOD IN STOOL: 0
ANAL BLEEDING: 1
RECTAL PAIN: 1
COUGH: 0
PALPITATIONS: 0
ABDOMINAL PAIN: 0
VOMITING: 0
HEADACHES: 0
DIARRHEA: 0
SHORTNESS OF BREATH: 0
NAUSEA: 0
DIZZINESS: 0
CONSTIPATION: 0
FEVER: 0

## 2025-03-31 NOTE — H&P (VIEW-ONLY)
GENERAL SURGERY CLINIC NOTE    Cari Forde   2000   [id number]     History Of Present Illness  Cari Forde is a 24 y.o. female who presents to the office for follow up of hemorrhoids. She has had them for at least 2 years, but have been bothering her lately with bleeding and irritation. She has 1-2 bowel movements a day, but was straining a bit and the stool was quite firm. Since the last appointment, she has been using supplemental fiber and a laxative and the straining and hard bowel movements have improved significantly. However, she continues to feel pain and notice occasional bleeding.     She is a  here at [facility].     Past Medical History  She has a past medical history of Depression, Kidney stone (9/22/23), and Rectal bleeding.    She has no past medical history of Personal history of irradiation.    Surgical History  She has a past surgical history that includes [place] tooth extraction.    Medications  Current Outpatient Medications on File Prior to Visit   Medication Sig Dispense Refill    docusate sodium (Colace) 100 mg capsule Take 1 capsule (100 mg) by mouth 2 times a day. 60 capsule 0    escitalopram (Lexapro) 20 mg tablet Take 1 tablet (20 mg) by mouth once daily. 90 tablet 3    lidocaine HCl-hydrocortison ac 3-0.5 % cream Insert 1 application into the rectum twice daily as needed for up to 7 days 28.35 g 0    magnesium glycinate 100 mg magnesium capsule Take 1 capsule (100 mg) by mouth once daily. 100 capsule 3    metFORMIN  mg 24 hr tablet Take 1 tablet (500 mg) by mouth once daily in the evening. Take with meals. Do not crush, chew, or split. 30 tablet 11    riboflavin (vitamin B2) 100 mg tablet tablet Take 1 tablet (100 mg) by mouth once daily. 100 tablet 3     No current facility-administered medications on file prior to visit.       Allergies  Patient has no known allergies.     Social History  She reports that she has never smoked. She has never used smokeless  "tobacco. She reports that she does not currently use alcohol after a past usage of about 4.0 standard drinks of alcohol per week. She reports that she does not use drugs.    Family History  Family History   Problem Relation Name Age of Onset    Hypertension Mother Lidia Forde     Breast cancer Mother Lidia Forde     Diabetes Maternal Grandmother Gabby     Hypertension Maternal Grandmother Gabby     Breast cancer Maternal Grandmother Gabby         Review of Systems   Constitutional:  Negative for chills and fever.   Respiratory:  Negative for cough and shortness of breath.    Cardiovascular:  Negative for chest pain and palpitations.   Gastrointestinal:  Positive for anal bleeding and rectal pain. Negative for abdominal pain, blood in stool, constipation, diarrhea, nausea and vomiting.   Neurological:  Negative for dizziness and headaches.   All other systems reviewed and are negative.      Last Recorded Vitals  Blood pressure 107/76, pulse 80, height 1.575 m (5' 2\"), weight 92.3 kg (203 lb 8 oz), SpO2 97%, not currently breastfeeding.     Physical Exam  Constitutional:       General: She is not in acute distress.     Appearance: Normal appearance. She is not ill-appearing.   HENT:      Head: Normocephalic and atraumatic.   Cardiovascular:      Rate and Rhythm: Normal rate and regular rhythm.   Pulmonary:      Effort: Pulmonary effort is normal. No respiratory distress.      Breath sounds: Normal breath sounds.   Genitourinary:     Comments: External exam: small, nonthrombosed hemorrhoid that is external in origin in the left lateral position, no other significant external hemorrhoids  Musculoskeletal:         General: No swelling.   Skin:     General: Skin is warm and dry.   Neurological:      Mental Status: She is alert and oriented to person, place, and time. Mental status is at baseline.   Psychiatric:         Mood and Affect: Mood normal.         Behavior: Behavior normal.          Relevant Results    No " results found for this or any previous visit (from the past 24 hours).    No results found.    Assessment and Plan  24 y.o. female with a symptomatic left lateral external hemorrhoid in the setting of straining with bowel movements. It remains quite symptomatic despite improving the quality of her bowel movements. She is interested in hemorrhoidectomy and understands that the recovery process can be quite painful. The risks and benefits of the procedure were discussed. Risks include allergic reactions, heart or lung complications, bleeding, infection, injury to surrounding tissues, additional procedures required, nonresolution of symptoms, and pain. The patient expressed understanding and provided informed consent for surgery.     OR 4/8/25 for rectal examination under anesthesia, hemorrhoidectomy. Phone screening. Urine hcg the morning of surgery.    Sarah Iniguez MD, [facility]  General Surgery

## 2025-03-31 NOTE — PROGRESS NOTES
GENERAL SURGERY CLINIC NOTE    Cari Forde   2000   [id number]     History Of Present Illness  Cari Forde is a 24 y.o. female who presents to the office for follow up of hemorrhoids. She has had them for at least 2 years, but have been bothering her lately with bleeding and irritation. She has 1-2 bowel movements a day, but was straining a bit and the stool was quite firm. Since the last appointment, she has been using supplemental fiber and a laxative and the straining and hard bowel movements have improved significantly. However, she continues to feel pain and notice occasional bleeding.     She is a  here at [facility].     Past Medical History  She has a past medical history of Depression, Kidney stone (9/22/23), and Rectal bleeding.    She has no past medical history of Personal history of irradiation.    Surgical History  She has a past surgical history that includes [place] tooth extraction.    Medications  Current Outpatient Medications on File Prior to Visit   Medication Sig Dispense Refill    docusate sodium (Colace) 100 mg capsule Take 1 capsule (100 mg) by mouth 2 times a day. 60 capsule 0    escitalopram (Lexapro) 20 mg tablet Take 1 tablet (20 mg) by mouth once daily. 90 tablet 3    lidocaine HCl-hydrocortison ac 3-0.5 % cream Insert 1 application into the rectum twice daily as needed for up to 7 days 28.35 g 0    magnesium glycinate 100 mg magnesium capsule Take 1 capsule (100 mg) by mouth once daily. 100 capsule 3    metFORMIN  mg 24 hr tablet Take 1 tablet (500 mg) by mouth once daily in the evening. Take with meals. Do not crush, chew, or split. 30 tablet 11    riboflavin (vitamin B2) 100 mg tablet tablet Take 1 tablet (100 mg) by mouth once daily. 100 tablet 3     No current facility-administered medications on file prior to visit.       Allergies  Patient has no known allergies.     Social History  She reports that she has never smoked. She has never used smokeless  "tobacco. She reports that she does not currently use alcohol after a past usage of about 4.0 standard drinks of alcohol per week. She reports that she does not use drugs.    Family History  Family History   Problem Relation Name Age of Onset    Hypertension Mother Lidia Forde     Breast cancer Mother Lidia Forde     Diabetes Maternal Grandmother Gabby     Hypertension Maternal Grandmother Gabby     Breast cancer Maternal Grandmother Gabby         Review of Systems   Constitutional:  Negative for chills and fever.   Respiratory:  Negative for cough and shortness of breath.    Cardiovascular:  Negative for chest pain and palpitations.   Gastrointestinal:  Positive for anal bleeding and rectal pain. Negative for abdominal pain, blood in stool, constipation, diarrhea, nausea and vomiting.   Neurological:  Negative for dizziness and headaches.   All other systems reviewed and are negative.      Last Recorded Vitals  Blood pressure 107/76, pulse 80, height 1.575 m (5' 2\"), weight 92.3 kg (203 lb 8 oz), SpO2 97%, not currently breastfeeding.     Physical Exam  Constitutional:       General: She is not in acute distress.     Appearance: Normal appearance. She is not ill-appearing.   HENT:      Head: Normocephalic and atraumatic.   Cardiovascular:      Rate and Rhythm: Normal rate and regular rhythm.   Pulmonary:      Effort: Pulmonary effort is normal. No respiratory distress.      Breath sounds: Normal breath sounds.   Genitourinary:     Comments: External exam: small, nonthrombosed hemorrhoid that is external in origin in the left lateral position, no other significant external hemorrhoids  Musculoskeletal:         General: No swelling.   Skin:     General: Skin is warm and dry.   Neurological:      Mental Status: She is alert and oriented to person, place, and time. Mental status is at baseline.   Psychiatric:         Mood and Affect: Mood normal.         Behavior: Behavior normal.          Relevant Results    No " results found for this or any previous visit (from the past 24 hours).    No results found.    Assessment and Plan  24 y.o. female with a symptomatic left lateral external hemorrhoid in the setting of straining with bowel movements. It remains quite symptomatic despite improving the quality of her bowel movements. She is interested in hemorrhoidectomy and understands that the recovery process can be quite painful. The risks and benefits of the procedure were discussed. Risks include allergic reactions, heart or lung complications, bleeding, infection, injury to surrounding tissues, additional procedures required, nonresolution of symptoms, and pain. The patient expressed understanding and provided informed consent for surgery.     OR 4/8/25 for rectal examination under anesthesia, hemorrhoidectomy. Phone screening. Urine hcg the morning of surgery.    Sarah Iniguez MD, [facility]  General Surgery

## 2025-04-04 ENCOUNTER — CLINICAL SUPPORT (OUTPATIENT)
Dept: PREADMISSION TESTING | Facility: HOSPITAL | Age: 25
End: 2025-04-04
Payer: COMMERCIAL

## 2025-04-04 NOTE — PREPROCEDURE INSTRUCTIONS
Medication List            Accurate as of April 4, 2025  8:10 AM. Always use your most recent med list.                docusate sodium 100 mg capsule  Commonly known as: Colace  Take 1 capsule (100 mg) by mouth 2 times a day.     escitalopram 20 mg tablet  Commonly known as: Lexapro  Take 1 tablet (20 mg) by mouth once daily.     lidocaine HCl-hydrocortison ac 3-0.5 % cream  Insert 1 application into the rectum twice daily as needed for up to 7 days     magnesium glycinate 100 mg magnesium capsule  Take 1 capsule (100 mg) by mouth once daily.     metFORMIN  mg 24 hr tablet  Commonly known as: Glucophage-XR  Take 1 tablet (500 mg) by mouth once daily in the evening. Take with meals. Do not crush, chew, or split.     riboflavin 100 mg tablet tablet  Commonly known as: vitamin B2  Take 1 tablet (100 mg) by mouth once daily.                   PRE-SURGERY INSTRUCTIONS :    Hold Ibuprofen and Aspirin products until after surgery.  Acetaminophen products are okay to take.     Do not drink any liquid after midnight the night before your surgery  Do not eat any food after midnight the night before your surgery/procedure.  Candy, gum, mints and smoking of cigarettes, marijuana or vaping is not permitted after midnight prior to your surgery   Do not drink Alcohol 24 hours prior to surgery      Increase fluid intake day before surgery    Additional Instructions:      Review your medication instructions, take indicated medications    Wear  comfortable loose fitting clothing  Do not use moisturizers, creams, lotions or perfume  All jewelry and valuables should be left at home. May bring glasses and partials.    Shower or bathe the night before and day of surgery.   Brush teeth and avoid perfumes, colognes, powders, makeup, aftershave and hair spray    Go to Registration, in the main lobby, upon arrival on the day of surgery and have 's license and medical insurance card available.    You will get a phone call the  afternoon before your surgery telling you what to arrive the next day.     Please have a responsible adult to drive you home and be available to help you as needed after surgery.   Cannot use public transportation or an insurance service for your ride home.

## 2025-04-07 ENCOUNTER — ANESTHESIA EVENT (OUTPATIENT)
Dept: OPERATING ROOM | Facility: HOSPITAL | Age: 25
End: 2025-04-07
Payer: COMMERCIAL

## 2025-04-07 ENCOUNTER — APPOINTMENT (OUTPATIENT)
Dept: SURGERY | Facility: CLINIC | Age: 25
End: 2025-04-07
Payer: COMMERCIAL

## 2025-04-08 ENCOUNTER — ANESTHESIA (OUTPATIENT)
Dept: OPERATING ROOM | Facility: HOSPITAL | Age: 25
End: 2025-04-08
Payer: COMMERCIAL

## 2025-04-08 ENCOUNTER — HOSPITAL ENCOUNTER (OUTPATIENT)
Facility: HOSPITAL | Age: 25
Setting detail: OUTPATIENT SURGERY
Discharge: HOME | End: 2025-04-08
Attending: SURGERY | Admitting: SURGERY
Payer: COMMERCIAL

## 2025-04-08 ENCOUNTER — PHARMACY VISIT (OUTPATIENT)
Dept: PHARMACY | Facility: CLINIC | Age: 25
End: 2025-04-08
Payer: MEDICAID

## 2025-04-08 VITALS
HEIGHT: 62 IN | WEIGHT: 203 LBS | OXYGEN SATURATION: 100 % | RESPIRATION RATE: 16 BRPM | BODY MASS INDEX: 37.36 KG/M2 | DIASTOLIC BLOOD PRESSURE: 85 MMHG | TEMPERATURE: 97 F | SYSTOLIC BLOOD PRESSURE: 120 MMHG | HEART RATE: 80 BPM

## 2025-04-08 DIAGNOSIS — K64.8 INTERNAL AND EXTERNAL BLEEDING HEMORRHOIDS: Primary | ICD-10-CM

## 2025-04-08 DIAGNOSIS — K64.4 INTERNAL AND EXTERNAL BLEEDING HEMORRHOIDS: Primary | ICD-10-CM

## 2025-04-08 LAB
GLUCOSE BLD MANUAL STRIP-MCNC: 106 MG/DL (ref 74–99)
PREGNANCY TEST URINE, POC: NEGATIVE

## 2025-04-08 PROCEDURE — 3600000002 HC OR TIME - INITIAL BASE CHARGE - PROCEDURE LEVEL TWO: Performed by: SURGERY

## 2025-04-08 PROCEDURE — 3700000002 HC GENERAL ANESTHESIA TIME - EACH INCREMENTAL 1 MINUTE: Performed by: SURGERY

## 2025-04-08 PROCEDURE — 2500000005 HC RX 250 GENERAL PHARMACY W/O HCPCS: Performed by: SURGERY

## 2025-04-08 PROCEDURE — 7100000002 HC RECOVERY ROOM TIME - EACH INCREMENTAL 1 MINUTE: Performed by: SURGERY

## 2025-04-08 PROCEDURE — 2500000002 HC RX 250 W HCPCS SELF ADMINISTERED DRUGS (ALT 637 FOR MEDICARE OP, ALT 636 FOR OP/ED): Performed by: ANESTHESIOLOGY

## 2025-04-08 PROCEDURE — 81025 URINE PREGNANCY TEST: CPT | Performed by: ANESTHESIOLOGY

## 2025-04-08 PROCEDURE — 3700000001 HC GENERAL ANESTHESIA TIME - INITIAL BASE CHARGE: Performed by: SURGERY

## 2025-04-08 PROCEDURE — 46255 REMOVE INT/EXT HEM 1 GROUP: CPT | Performed by: SURGERY

## 2025-04-08 PROCEDURE — RXMED WILLOW AMBULATORY MEDICATION CHARGE

## 2025-04-08 PROCEDURE — 2500000004 HC RX 250 GENERAL PHARMACY W/ HCPCS (ALT 636 FOR OP/ED): Performed by: NURSE ANESTHETIST, CERTIFIED REGISTERED

## 2025-04-08 PROCEDURE — 0752T DGTZ GLS MCRSCP SLD LVL III: CPT | Mod: TC,PORLAB | Performed by: SURGERY

## 2025-04-08 PROCEDURE — 2500000001 HC RX 250 WO HCPCS SELF ADMINISTERED DRUGS (ALT 637 FOR MEDICARE OP): Performed by: ANESTHESIOLOGY

## 2025-04-08 PROCEDURE — 82947 ASSAY GLUCOSE BLOOD QUANT: CPT

## 2025-04-08 PROCEDURE — 2500000004 HC RX 250 GENERAL PHARMACY W/ HCPCS (ALT 636 FOR OP/ED): Performed by: ANESTHESIOLOGY

## 2025-04-08 PROCEDURE — 2500000004 HC RX 250 GENERAL PHARMACY W/ HCPCS (ALT 636 FOR OP/ED): Performed by: SURGERY

## 2025-04-08 PROCEDURE — 2720000007 HC OR 272 NO HCPCS: Performed by: SURGERY

## 2025-04-08 PROCEDURE — 7100000009 HC PHASE TWO TIME - INITIAL BASE CHARGE: Performed by: SURGERY

## 2025-04-08 PROCEDURE — 7100000010 HC PHASE TWO TIME - EACH INCREMENTAL 1 MINUTE: Performed by: SURGERY

## 2025-04-08 PROCEDURE — 3600000007 HC OR TIME - EACH INCREMENTAL 1 MINUTE - PROCEDURE LEVEL TWO: Performed by: SURGERY

## 2025-04-08 PROCEDURE — 7100000001 HC RECOVERY ROOM TIME - INITIAL BASE CHARGE: Performed by: SURGERY

## 2025-04-08 RX ORDER — FENTANYL CITRATE 50 UG/ML
INJECTION, SOLUTION INTRAMUSCULAR; INTRAVENOUS AS NEEDED
Status: DISCONTINUED | OUTPATIENT
Start: 2025-04-08 | End: 2025-04-08

## 2025-04-08 RX ORDER — MIDAZOLAM HYDROCHLORIDE 1 MG/ML
INJECTION, SOLUTION INTRAMUSCULAR; INTRAVENOUS AS NEEDED
Status: DISCONTINUED | OUTPATIENT
Start: 2025-04-08 | End: 2025-04-08

## 2025-04-08 RX ORDER — MORPHINE SULFATE 2 MG/ML
2 INJECTION, SOLUTION INTRAMUSCULAR; INTRAVENOUS EVERY 5 MIN PRN
Status: DISCONTINUED | OUTPATIENT
Start: 2025-04-08 | End: 2025-04-08 | Stop reason: HOSPADM

## 2025-04-08 RX ORDER — BUPIVACAINE HYDROCHLORIDE 2.5 MG/ML
INJECTION, SOLUTION EPIDURAL; INFILTRATION; INTRACAUDAL; PERINEURAL AS NEEDED
Status: DISCONTINUED | OUTPATIENT
Start: 2025-04-08 | End: 2025-04-08 | Stop reason: HOSPADM

## 2025-04-08 RX ORDER — LABETALOL HYDROCHLORIDE 5 MG/ML
5 INJECTION, SOLUTION INTRAVENOUS ONCE AS NEEDED
Status: DISCONTINUED | OUTPATIENT
Start: 2025-04-08 | End: 2025-04-08 | Stop reason: HOSPADM

## 2025-04-08 RX ORDER — ACETAMINOPHEN 325 MG/1
650 TABLET ORAL EVERY 6 HOURS PRN
Qty: 20 TABLET | Refills: 0 | Status: SHIPPED | OUTPATIENT
Start: 2025-04-08 | End: 2025-04-18

## 2025-04-08 RX ORDER — FAMOTIDINE 10 MG/ML
20 INJECTION, SOLUTION INTRAVENOUS ONCE
Status: COMPLETED | OUTPATIENT
Start: 2025-04-08 | End: 2025-04-08

## 2025-04-08 RX ORDER — MEPERIDINE HYDROCHLORIDE 25 MG/ML
12.5 INJECTION INTRAMUSCULAR; INTRAVENOUS; SUBCUTANEOUS EVERY 10 MIN PRN
Status: DISCONTINUED | OUTPATIENT
Start: 2025-04-08 | End: 2025-04-08 | Stop reason: HOSPADM

## 2025-04-08 RX ORDER — ONDANSETRON HYDROCHLORIDE 2 MG/ML
4 INJECTION, SOLUTION INTRAVENOUS ONCE AS NEEDED
Status: DISCONTINUED | OUTPATIENT
Start: 2025-04-08 | End: 2025-04-08 | Stop reason: HOSPADM

## 2025-04-08 RX ORDER — DROPERIDOL 2.5 MG/ML
0.62 INJECTION, SOLUTION INTRAMUSCULAR; INTRAVENOUS ONCE AS NEEDED
Status: DISCONTINUED | OUTPATIENT
Start: 2025-04-08 | End: 2025-04-08 | Stop reason: HOSPADM

## 2025-04-08 RX ORDER — LIDOCAINE HYDROCHLORIDE 20 MG/ML
JELLY TOPICAL AS NEEDED
Status: DISCONTINUED | OUTPATIENT
Start: 2025-04-08 | End: 2025-04-08 | Stop reason: HOSPADM

## 2025-04-08 RX ORDER — DIPHENHYDRAMINE HYDROCHLORIDE 50 MG/ML
12.5 INJECTION, SOLUTION INTRAMUSCULAR; INTRAVENOUS ONCE AS NEEDED
Status: DISCONTINUED | OUTPATIENT
Start: 2025-04-08 | End: 2025-04-08 | Stop reason: HOSPADM

## 2025-04-08 RX ORDER — HYDRALAZINE HYDROCHLORIDE 20 MG/ML
5 INJECTION INTRAMUSCULAR; INTRAVENOUS EVERY 30 MIN PRN
Status: DISCONTINUED | OUTPATIENT
Start: 2025-04-08 | End: 2025-04-08 | Stop reason: HOSPADM

## 2025-04-08 RX ORDER — ONDANSETRON HYDROCHLORIDE 2 MG/ML
INJECTION, SOLUTION INTRAVENOUS AS NEEDED
Status: DISCONTINUED | OUTPATIENT
Start: 2025-04-08 | End: 2025-04-08

## 2025-04-08 RX ORDER — LIDOCAINE HYDROCHLORIDE 10 MG/ML
0.1 INJECTION, SOLUTION EPIDURAL; INFILTRATION; INTRACAUDAL; PERINEURAL ONCE
Status: DISCONTINUED | OUTPATIENT
Start: 2025-04-08 | End: 2025-04-08 | Stop reason: HOSPADM

## 2025-04-08 RX ORDER — ALBUTEROL SULFATE 0.83 MG/ML
2.5 SOLUTION RESPIRATORY (INHALATION) ONCE AS NEEDED
Status: COMPLETED | OUTPATIENT
Start: 2025-04-08 | End: 2025-04-08

## 2025-04-08 RX ORDER — OXYCODONE HYDROCHLORIDE 5 MG/1
5 TABLET ORAL EVERY 6 HOURS PRN
Qty: 15 TABLET | Refills: 0 | Status: SHIPPED | OUTPATIENT
Start: 2025-04-08 | End: 2025-04-15

## 2025-04-08 RX ORDER — OXYCODONE AND ACETAMINOPHEN 5; 325 MG/1; MG/1
1 TABLET ORAL EVERY 4 HOURS PRN
Status: DISCONTINUED | OUTPATIENT
Start: 2025-04-08 | End: 2025-04-08 | Stop reason: HOSPADM

## 2025-04-08 RX ORDER — SODIUM CHLORIDE, SODIUM LACTATE, POTASSIUM CHLORIDE, CALCIUM CHLORIDE 600; 310; 30; 20 MG/100ML; MG/100ML; MG/100ML; MG/100ML
75 INJECTION, SOLUTION INTRAVENOUS CONTINUOUS
Status: DISCONTINUED | OUTPATIENT
Start: 2025-04-08 | End: 2025-04-08 | Stop reason: HOSPADM

## 2025-04-08 RX ORDER — SODIUM CHLORIDE, SODIUM LACTATE, POTASSIUM CHLORIDE, CALCIUM CHLORIDE 600; 310; 30; 20 MG/100ML; MG/100ML; MG/100ML; MG/100ML
100 INJECTION, SOLUTION INTRAVENOUS CONTINUOUS
Status: DISCONTINUED | OUTPATIENT
Start: 2025-04-08 | End: 2025-04-08 | Stop reason: HOSPADM

## 2025-04-08 RX ORDER — DOCUSATE SODIUM 100 MG/1
100 CAPSULE, LIQUID FILLED ORAL 2 TIMES DAILY
Qty: 10 CAPSULE | Refills: 0 | Status: SHIPPED | OUTPATIENT
Start: 2025-04-08 | End: 2025-04-13

## 2025-04-08 RX ORDER — KETOROLAC TROMETHAMINE 30 MG/ML
INJECTION, SOLUTION INTRAMUSCULAR; INTRAVENOUS AS NEEDED
Status: DISCONTINUED | OUTPATIENT
Start: 2025-04-08 | End: 2025-04-08

## 2025-04-08 RX ORDER — PROPOFOL 10 MG/ML
INJECTION, EMULSION INTRAVENOUS AS NEEDED
Status: DISCONTINUED | OUTPATIENT
Start: 2025-04-08 | End: 2025-04-08

## 2025-04-08 RX ADMIN — DEXAMETHASONE SODIUM PHOSPHATE 4 MG: 4 INJECTION INTRA-ARTICULAR; INTRALESIONAL; INTRAMUSCULAR; INTRAVENOUS; SOFT TISSUE at 13:13

## 2025-04-08 RX ADMIN — SODIUM CHLORIDE, POTASSIUM CHLORIDE, SODIUM LACTATE AND CALCIUM CHLORIDE: 600; 310; 30; 20 INJECTION, SOLUTION INTRAVENOUS at 12:54

## 2025-04-08 RX ADMIN — KETOROLAC TROMETHAMINE 30 MG: 30 INJECTION, SOLUTION INTRAMUSCULAR; INTRAVENOUS at 13:25

## 2025-04-08 RX ADMIN — FENTANYL CITRATE 50 MCG: 50 INJECTION INTRAMUSCULAR; INTRAVENOUS at 13:15

## 2025-04-08 RX ADMIN — HYDROMORPHONE HYDROCHLORIDE 0.5 MG: 0.5 INJECTION, SOLUTION INTRAMUSCULAR; INTRAVENOUS; SUBCUTANEOUS at 14:51

## 2025-04-08 RX ADMIN — ONDANSETRON 4 MG: 2 INJECTION INTRAMUSCULAR; INTRAVENOUS at 13:08

## 2025-04-08 RX ADMIN — PROPOFOL 140 MCG/KG/MIN: 10 INJECTION, EMULSION INTRAVENOUS at 13:10

## 2025-04-08 RX ADMIN — PROPOFOL 50 MG: 10 INJECTION, EMULSION INTRAVENOUS at 13:06

## 2025-04-08 RX ADMIN — HYDROMORPHONE HYDROCHLORIDE 0.5 MG: 0.5 INJECTION, SOLUTION INTRAMUSCULAR; INTRAVENOUS; SUBCUTANEOUS at 14:31

## 2025-04-08 RX ADMIN — MIDAZOLAM 2 MG: 1 INJECTION INTRAMUSCULAR; INTRAVENOUS at 13:08

## 2025-04-08 RX ADMIN — FENTANYL CITRATE 50 MCG: 50 INJECTION INTRAMUSCULAR; INTRAVENOUS at 13:09

## 2025-04-08 RX ADMIN — OXYCODONE HYDROCHLORIDE AND ACETAMINOPHEN 1 TABLET: 5; 325 TABLET ORAL at 15:08

## 2025-04-08 RX ADMIN — MORPHINE SULFATE 2 MG: 2 INJECTION, SOLUTION INTRAMUSCULAR; INTRAVENOUS at 15:29

## 2025-04-08 RX ADMIN — ALBUTEROL SULFATE 2.5 MG: 2.5 SOLUTION RESPIRATORY (INHALATION) at 13:56

## 2025-04-08 RX ADMIN — FAMOTIDINE 20 MG: 10 INJECTION, SOLUTION INTRAVENOUS at 11:18

## 2025-04-08 RX ADMIN — HYDROMORPHONE HYDROCHLORIDE 0.5 MG: 0.5 INJECTION, SOLUTION INTRAMUSCULAR; INTRAVENOUS; SUBCUTANEOUS at 14:12

## 2025-04-08 RX ADMIN — HYDROMORPHONE HYDROCHLORIDE 0.5 MG: 0.5 INJECTION, SOLUTION INTRAMUSCULAR; INTRAVENOUS; SUBCUTANEOUS at 14:20

## 2025-04-08 SDOH — HEALTH STABILITY: MENTAL HEALTH: CURRENT SMOKER: 0

## 2025-04-08 ASSESSMENT — PAIN - FUNCTIONAL ASSESSMENT

## 2025-04-08 ASSESSMENT — PAIN DESCRIPTION - LOCATION
LOCATION: RECTUM

## 2025-04-08 ASSESSMENT — PAIN SCALES - GENERAL
PAINLEVEL_OUTOF10: 6
PAINLEVEL_OUTOF10: 9
PAINLEVEL_OUTOF10: 8
PAINLEVEL_OUTOF10: 0 - NO PAIN
PAINLEVEL_OUTOF10: 9
PAINLEVEL_OUTOF10: 7
PAINLEVEL_OUTOF10: 7
PAINLEVEL_OUTOF10: 0 - NO PAIN
PAINLEVEL_OUTOF10: 0 - NO PAIN
PAINLEVEL_OUTOF10: 6
PAINLEVEL_OUTOF10: 0 - NO PAIN
PAINLEVEL_OUTOF10: 7
PAINLEVEL_OUTOF10: 8
PAINLEVEL_OUTOF10: 6
PAIN_LEVEL: 6
PAINLEVEL_OUTOF10: 7
PAINLEVEL_OUTOF10: 5 - MODERATE PAIN
PAINLEVEL_OUTOF10: 7
PAINLEVEL_OUTOF10: 6
PAINLEVEL_OUTOF10: 0 - NO PAIN
PAINLEVEL_OUTOF10: 6
PAINLEVEL_OUTOF10: 7
PAINLEVEL_OUTOF10: 6
PAINLEVEL_OUTOF10: 0 - NO PAIN

## 2025-04-08 ASSESSMENT — PAIN SCALES - PAIN ASSESSMENT IN ADVANCED DEMENTIA (PAINAD): TOTALSCORE: MEDICATION (SEE MAR)

## 2025-04-08 NOTE — ANESTHESIA POSTPROCEDURE EVALUATION
Patient: Cari Forde    Procedure Summary       Date: 04/08/25 Room / Location: [facility] OR 01 / Virtual [facility] OR    Anesthesia Start: 1253 Anesthesia Stop: 1336    Procedure: Rectal examination under anesthesia, hemorrhoidectomy Diagnosis:       Internal and external bleeding hemorrhoids      (Internal and external bleeding hemorrhoids [K64.4, K64.8])    Surgeons: Sarah Iniguez MD Responsible Provider: MELCHOR Garcia    Anesthesia Type: MAC ASA Status: 2            Anesthesia Type: MAC    Vitals Value Taken Time   /87 04/08/25 1430   Temp 36.6 °C (97.8 °F) 04/08/25 1330   Pulse 92 04/08/25 1435   Resp 12 04/08/25 1435   SpO2 100 % 04/08/25 1435   Vitals shown include unfiled device data.    Anesthesia Post Evaluation    Patient location during evaluation: PACU  Patient participation: complete - patient participated  Level of consciousness: awake  Pain score: 6  Pain management: adequate  Airway patency: patent  Cardiovascular status: acceptable  Respiratory status: acceptable  Hydration status: acceptable  Postoperative Nausea and Vomiting: none    No notable events documented.

## 2025-04-08 NOTE — ANESTHESIA PREPROCEDURE EVALUATION
Patient: Cari Forde    Procedure Information       Date/Time: 04/08/25 1250    Procedure: Rectal examination under anesthesia, hemorrhoidectomy - 30 MINUTES    Location: [facility] OR 01 / Virtual [facility] OR    Surgeons: Sarah Iniguez MD            Relevant Problems   Neuro   (+) Chronic tension-type headache, not intractable   (+) Lumbar radiculopathy   (+) Postpartum depression      GI   (+) Internal and external bleeding hemorrhoids      /Renal   (+) Kidney stone on left side       Clinical information reviewed:                   NPO Detail:  No data recorded     Physical Exam    Airway  Mallampati: II  TM distance: >3 FB     Cardiovascular - normal exam     Dental    Pulmonary - normal exam     Abdominal - normal exam             Anesthesia Plan    History of general anesthesia?: yes  History of complications of general anesthesia?: no    ASA 2     MAC     The patient is not a current smoker.    intravenous induction   Anesthetic plan and risks discussed with patient.  Use of blood products discussed with patient who consented to blood products.    Plan discussed with CRNA.       no swelling of extremity/no enlarged lymph nodes/no tender lymph nodes

## 2025-04-08 NOTE — OP NOTE
Rectal examination under anesthesia, hemorrhoidectomy Operative Note     Date: 2025  OR Location: [facility] OR    Name: Cari Forde, : 2000, Age: 24 y.o., MRN: [id number], Sex: female    Diagnosis  Pre-op Diagnosis      * Internal and external bleeding hemorrhoids [K64.4, K64.8] Post-op Diagnosis     * Internal and external bleeding hemorrhoids [K64.4, K64.8]     Procedures  Rectal examination under anesthesia, hemorrhoidectomy  38899 - TN HEMORRHOIDECTOMY NTRNL & XTRNL 1 COLUMN/GROUP      Surgeons      * Sarah Iniguez - Primary    Resident/Fellow/Other Assistant:  Surgeons and Role:  * No surgeons found with a matching role *  Brandin Pugh MD    Staff:   Pjulator: Rhea Garner Person: Catrachita  Surgical Assistant: Terrie    Anesthesia Staff: CRNA: ROLO Garcia-SHAN    Procedure Summary  Anesthesia: Anesthesia type not filed in the log.  ASA: II  Estimated Blood Loss: 3mL  Intra-op Medications:   Administrations occurring from 1250 to 1353 on 25:   Medication Name Total Dose   bupivacaine PF 0.25 % (Marcaine) 0.25 % (2.5 mg/mL) injection 12 mL   dexAMETHasone (Decadron) 4 mg/mL 4 mg   fentaNYL PF 0.05 mg/mL 100 mcg   midazolam (Versed) 1 mg/1 mL 2 mg   ondansetron (Zofran) 2 mg/mL injection 4 mg   propofol (Diprivan) injection 10 mg/mL 674.44 mg              Anesthesia Record               Intraprocedure I/O Totals       None           Specimen: No specimens collected     Findings: small internal hemorrhoids. Moderately sized external hemorrhoid in the left lateral group    Indications: Cari Forde is an 24 y.o. female who is having surgery for Internal and external bleeding hemorrhoids [K64.4, K64.8].     The patient was seen in the preoperative area. The risks, benefits, complications, treatment options, non-operative alternatives, expected recovery and outcomes were discussed with the patient. The possibilities of reaction to medication, pulmonary aspiration, injury to surrounding  structures, bleeding, recurrent infection, the need for additional procedures, failure to diagnose a condition, and creating a complication requiring transfusion or operation were discussed with the patient. The patient concurred with the proposed plan, giving informed consent.  The site of surgery was properly noted/marked if necessary per policy. The patient has been actively warmed in preoperative area. Preoperative antibiotics are not indicated. Venous thrombosis prophylaxis have been ordered including bilateral sequential compression devices    Procedure Details:   The patient was taken to the operating room and underwent monitored anesthesia care. Sequential compression devices were placed. The patient was placed in lithotomy position and all pressure points were padded. The perineal region was prepped and draped in the usual sterile fashion using betadine. A time-out was performed with all parties present.    Local anesthesia was given using 0.25% Marcaine. An anoscopy was performed using the half-moon retractor. The patient had small internal hemorrhoids in all groups. Externally, she had the symptomatic nonthrombosed external hemorrhoid in the left lateral group. The mucosa otherwise appeared smooth and without any masses. Hemorrhoidectomy was performed using electrocautery. Hemostasis was achieved. A piece of surgifoam was coated in lidocaine gel and placed into the anal canal for packing.     The patient was awoken and taken to recovery in stable condition. All needle, sponge and instrument counts were correct at the end of the case. I was present for the entire case.     Complications:  None; patient tolerated the procedure well.    Disposition: PACU - hemodynamically stable.  Condition: stable     Attending Attestation: I was present and scrubbed for the entire procedure.    Sarah Iniguez  Phone Number: [phone]

## 2025-04-11 ENCOUNTER — OFFICE VISIT (OUTPATIENT)
Dept: SURGERY | Facility: CLINIC | Age: 25
End: 2025-04-11
Payer: COMMERCIAL

## 2025-04-11 VITALS
WEIGHT: 208.6 LBS | HEART RATE: 87 BPM | SYSTOLIC BLOOD PRESSURE: 116 MMHG | BODY MASS INDEX: 38.39 KG/M2 | DIASTOLIC BLOOD PRESSURE: 87 MMHG | OXYGEN SATURATION: 96 % | HEIGHT: 62 IN

## 2025-04-11 DIAGNOSIS — K64.4 INTERNAL AND EXTERNAL BLEEDING HEMORRHOIDS: Primary | ICD-10-CM

## 2025-04-11 DIAGNOSIS — K64.8 INTERNAL AND EXTERNAL BLEEDING HEMORRHOIDS: Primary | ICD-10-CM

## 2025-04-11 PROCEDURE — 3008F BODY MASS INDEX DOCD: CPT | Performed by: SURGERY

## 2025-04-11 PROCEDURE — 1036F TOBACCO NON-USER: CPT | Performed by: SURGERY

## 2025-04-11 PROCEDURE — 99024 POSTOP FOLLOW-UP VISIT: CPT | Performed by: SURGERY

## 2025-04-11 ASSESSMENT — ENCOUNTER SYMPTOMS
VOMITING: 0
ANAL BLEEDING: 1
DIZZINESS: 0
CONSTIPATION: 0
NAUSEA: 0
FEVER: 0
DIARRHEA: 0
HEADACHES: 0
BLOOD IN STOOL: 0
CHILLS: 0
SHORTNESS OF BREATH: 0
PALPITATIONS: 0
RECTAL PAIN: 1
ABDOMINAL PAIN: 0
COUGH: 0

## 2025-04-11 NOTE — PROGRESS NOTES
GENERAL SURGERY CLINIC NOTE    Cari Forde   2000   [id number]     History Of Present Illness  Cari Forde returns to the office with concerns for rectal bleeding following her hemorrhoidectomy surgery 3 days ago by Dr. Iniguez.  She had undergone an excisional hemorrhoidectomy of a single column internal/external hemorrhoid 3 days ago.  The day after her procedure, she states she had very minimal bleeding.  Her first bowel movement was not until Wednesday.  Since this time, she has noticed blood per rectum for which she is using a menstrual pad.  She changes the pad every 2-3 hours, but the pad is not saturated.  The bleeding is less than her heaviest day of her menstrual period.  She was also concerned about some associated nausea and upper abdominal pain.  She admits that she has not been eating normally, and has not always eaten before taking her Percocet.  She has been trying to take more of the Tylenol and using the Percocet only at nighttime.  She is not using any type of cream or numbing agent on the surgical site.  She is not applying any ice or heat to the area either.  She was concerned about the bleeding.    She is a  here at [facility].     Past Medical History  She has a past medical history of Depression, Diabetes (Multi), Kidney stone (9/22/23), and Rectal bleeding.    She has no past medical history of Personal history of irradiation.    Surgical History  She has a past surgical history that includes [place] tooth extraction and Excisional hemorrhoidectomy (04/08/2025).    Medications  Current Outpatient Medications on File Prior to Visit   Medication Sig Dispense Refill    acetaminophen (Tylenol) 325 mg tablet Take 2 tablets (650 mg) by mouth every 6 hours if needed for mild pain (1 - 3) for up to 20 doses. 20 tablet 0    docusate sodium (Colace) 100 mg capsule Take 1 capsule (100 mg) by mouth 2 times a day for 10 doses. 10 capsule 0    escitalopram (Lexapro) 20 mg tablet Take 1  tablet (20 mg) by mouth once daily. 90 tablet 3    lidocaine (XYLOCAINE) 4 % gel gel Apply 1 Application topically if needed for pain. 30 g 0    lidocaine HCl-hydrocortison ac 3-0.5 % cream Insert 1 application into the rectum twice daily as needed for up to 7 days 28.35 g 0    magnesium glycinate 100 mg magnesium capsule Take 1 capsule (100 mg) by mouth once daily. 100 capsule 3    metFORMIN  mg 24 hr tablet Take 1 tablet (500 mg) by mouth once daily in the evening. Take with meals. Do not crush, chew, or split. 30 tablet 11    oxyCODONE (Roxicodone) 5 mg immediate release tablet Take 1 tablet (5 mg) by mouth every 6 hours if needed for severe pain (7 - 10) for up to 15 doses. 15 tablet 0    riboflavin (vitamin B2) 100 mg tablet tablet Take 1 tablet (100 mg) by mouth once daily. 100 tablet 3    docusate sodium (Colace) 100 mg capsule Take 1 capsule (100 mg) by mouth 2 times a day. (Patient not taking: Reported on 4/11/2025) 60 capsule 0     No current facility-administered medications on file prior to visit.       Allergies  Patient has no known allergies.     Social History  She reports that she has never smoked. She has never used smokeless tobacco. She reports that she does not currently use alcohol after a past usage of about 4.0 standard drinks of alcohol per week. She reports that she does not use drugs.    Family History  Family History   Problem Relation Name Age of Onset    Hypertension Mother Lidia Forde     Breast cancer Mother Lidia Forde     Diabetes Maternal Grandmother Gabby     Hypertension Maternal Grandmother Gabby     Breast cancer Maternal Grandmother Gabby         Review of Systems   Constitutional:  Negative for chills and fever.   Respiratory:  Negative for cough and shortness of breath.    Cardiovascular:  Negative for chest pain and palpitations.   Gastrointestinal:  Positive for anal bleeding and rectal pain. Negative for abdominal pain, blood in stool, constipation, diarrhea,  "nausea and vomiting.   Neurological:  Negative for dizziness and headaches.   All other systems reviewed and are negative.      Last Recorded Vitals  Blood pressure 116/87, pulse 87, height 1.575 m (5' 2\"), weight 94.6 kg (208 lb 9.6 oz), SpO2 96%, not currently breastfeeding.     Physical Exam  Constitutional:       General: She is not in acute distress.     Appearance: Normal appearance. She is not ill-appearing.   HENT:      Head: Normocephalic and atraumatic.   Cardiovascular:      Rate and Rhythm: Normal rate and regular rhythm.   Pulmonary:      Effort: Pulmonary effort is normal. No respiratory distress.      Breath sounds: Normal breath sounds.   Genitourinary:     Comments: External exam: No external hemorrhoids.  Small wound opening of the left anterior column without any active bleeding.  Sphincter tone is intact.  Unable to do rectal exam secondary to the patient's pain.  Musculoskeletal:         General: No swelling.   Skin:     General: Skin is warm and dry.   Neurological:      Mental Status: She is alert and oriented to person, place, and time. Mental status is at baseline.   Psychiatric:         Mood and Affect: Mood normal.         Behavior: Behavior normal.          Relevant Results    No results found for this or any previous visit (from the past 24 hours).    No results found.    Assessment and Plan  24 y.o. female POD#3 excisional hemorrhoidectomy of single column internal/external hemorrhoid    1.  Reviewed with the patient that the bleeding following the surgery is very normal.  The bleeding will increase with bowel movements.  However, do not feel that the bleeding is excessive at this point.  On her physical exam there was no active bleeding.  Continue local wound care.  2.  Discussed that her nausea is most likely secondary to the narcotic pain medication.  Recommended that she eat before taking any narcotic medication, or even before taking any over-the-counter medication.  She should use " multimodality pain management while trying to avoid narcotic usage.  Recommended alternating Tylenol and ibuprofen, ice or heating pack to the surgical site and use of lidocaine ointment.  3.  She will keep her follow-up appointment with Dr. Iniguez on 4/21/2025 for routine follow-up.    Mar Alvarado MD  [address]  [address] [phone]

## 2025-04-11 NOTE — PATIENT INSTRUCTIONS
1.  Continue using menstrual pads in your undergarments for any bleeding or drainage from the hemorrhoid surgery.  Bleeding usually starts to slow down after about 3 to 4 days.  However, if you have more bleeding in a 24-hour period which is heavier than you menstrual period, please call the office number.  2.  You may continue taking the Percocet for the pain.  However, recommend using over-the-counter Tylenol and ibuprofen to help with your pain.  You may also use over-the-counter lidocaine ointment.  The Percocet can cause constipation and most likely is the cause of your nausea.  3.  You may apply an ice pack or heating pad to your anal region to help with your pain.  4.  Keep your follow-up appointment with Dr. Iniguez on 4/21/2025.

## 2025-04-14 LAB
LABORATORY COMMENT REPORT: NORMAL
PATH REPORT.FINAL DX SPEC: NORMAL
PATH REPORT.GROSS SPEC: NORMAL
PATH REPORT.RELEVANT HX SPEC: NORMAL
PATH REPORT.TOTAL CANCER: NORMAL

## 2025-04-15 ENCOUNTER — TELEPHONE (OUTPATIENT)
Dept: SURGERY | Facility: CLINIC | Age: 25
End: 2025-04-15
Payer: COMMERCIAL

## 2025-04-15 NOTE — TELEPHONE ENCOUNTER
I spoke with the patient she is doing much better.  She denies any bleeding at this time.  She states that once she started eating more solid food the pain pills no longer bother her and the vomiting stopped. I rescheduled her 4/21/25 post op to the correct time frame of 5/5/25 and canceled her 5/12/25 appointment.

## 2025-04-21 ENCOUNTER — APPOINTMENT (OUTPATIENT)
Dept: SURGERY | Facility: CLINIC | Age: 25
End: 2025-04-21
Payer: COMMERCIAL

## 2025-04-30 ENCOUNTER — HOSPITAL ENCOUNTER (EMERGENCY)
Facility: HOSPITAL | Age: 25
Discharge: HOME | End: 2025-04-30
Payer: COMMERCIAL

## 2025-04-30 VITALS
HEART RATE: 77 BPM | OXYGEN SATURATION: 99 % | TEMPERATURE: 97 F | DIASTOLIC BLOOD PRESSURE: 81 MMHG | RESPIRATION RATE: 19 BRPM | HEIGHT: 62 IN | BODY MASS INDEX: 34.96 KG/M2 | WEIGHT: 190 LBS | SYSTOLIC BLOOD PRESSURE: 140 MMHG

## 2025-04-30 DIAGNOSIS — L92.3 TATTOO REACTION: Primary | ICD-10-CM

## 2025-04-30 LAB
ALBUMIN SERPL BCP-MCNC: 4.6 G/DL (ref 3.4–5)
ALP SERPL-CCNC: 87 U/L (ref 33–110)
ALT SERPL W P-5'-P-CCNC: 37 U/L (ref 7–45)
ANION GAP SERPL CALC-SCNC: 11 MMOL/L (ref 10–20)
AST SERPL W P-5'-P-CCNC: 24 U/L (ref 9–39)
B-HCG SERPL-ACNC: <2 MIU/ML
BASOPHILS # BLD AUTO: 0.03 X10*3/UL (ref 0–0.1)
BASOPHILS NFR BLD AUTO: 0.4 %
BILIRUB SERPL-MCNC: 0.6 MG/DL (ref 0–1.2)
BUN SERPL-MCNC: 12 MG/DL (ref 6–23)
CALCIUM SERPL-MCNC: 9.5 MG/DL (ref 8.6–10.3)
CHLORIDE SERPL-SCNC: 103 MMOL/L (ref 98–107)
CO2 SERPL-SCNC: 27 MMOL/L (ref 21–32)
CREAT SERPL-MCNC: 0.97 MG/DL (ref 0.5–1.05)
CRP SERPL-MCNC: 0.89 MG/DL
EGFRCR SERPLBLD CKD-EPI 2021: 84 ML/MIN/1.73M*2
EOSINOPHIL # BLD AUTO: 0.1 X10*3/UL (ref 0–0.7)
EOSINOPHIL NFR BLD AUTO: 1.3 %
ERYTHROCYTE [DISTWIDTH] IN BLOOD BY AUTOMATED COUNT: 12.8 % (ref 11.5–14.5)
ERYTHROCYTE [SEDIMENTATION RATE] IN BLOOD BY WESTERGREN METHOD: 16 MM/H (ref 0–20)
GLUCOSE SERPL-MCNC: 94 MG/DL (ref 74–99)
HCT VFR BLD AUTO: 44.6 % (ref 36–46)
HGB BLD-MCNC: 15.1 G/DL (ref 12–16)
IMM GRANULOCYTES # BLD AUTO: 0.01 X10*3/UL (ref 0–0.7)
IMM GRANULOCYTES NFR BLD AUTO: 0.1 % (ref 0–0.9)
LACTATE SERPL-SCNC: 1.3 MMOL/L (ref 0.4–2)
LYMPHOCYTES # BLD AUTO: 3.61 X10*3/UL (ref 1.2–4.8)
LYMPHOCYTES NFR BLD AUTO: 46.2 %
MCH RBC QN AUTO: 28.9 PG (ref 26–34)
MCHC RBC AUTO-ENTMCNC: 33.9 G/DL (ref 32–36)
MCV RBC AUTO: 85 FL (ref 80–100)
MONOCYTES # BLD AUTO: 0.47 X10*3/UL (ref 0.1–1)
MONOCYTES NFR BLD AUTO: 6 %
NEUTROPHILS # BLD AUTO: 3.6 X10*3/UL (ref 1.2–7.7)
NEUTROPHILS NFR BLD AUTO: 46 %
NRBC BLD-RTO: 0 /100 WBCS (ref 0–0)
PLATELET # BLD AUTO: 310 X10*3/UL (ref 150–450)
POTASSIUM SERPL-SCNC: 3.9 MMOL/L (ref 3.5–5.3)
PROT SERPL-MCNC: 7.6 G/DL (ref 6.4–8.2)
RBC # BLD AUTO: 5.22 X10*6/UL (ref 4–5.2)
SODIUM SERPL-SCNC: 137 MMOL/L (ref 136–145)
WBC # BLD AUTO: 7.8 X10*3/UL (ref 4.4–11.3)

## 2025-04-30 PROCEDURE — 85025 COMPLETE CBC W/AUTO DIFF WBC: CPT | Performed by: PHYSICIAN ASSISTANT

## 2025-04-30 PROCEDURE — 83605 ASSAY OF LACTIC ACID: CPT | Performed by: PHYSICIAN ASSISTANT

## 2025-04-30 PROCEDURE — 96365 THER/PROPH/DIAG IV INF INIT: CPT

## 2025-04-30 PROCEDURE — 80053 COMPREHEN METABOLIC PANEL: CPT | Performed by: PHYSICIAN ASSISTANT

## 2025-04-30 PROCEDURE — 99284 EMERGENCY DEPT VISIT MOD MDM: CPT | Mod: 25

## 2025-04-30 PROCEDURE — 85652 RBC SED RATE AUTOMATED: CPT | Performed by: PHYSICIAN ASSISTANT

## 2025-04-30 PROCEDURE — 2500000004 HC RX 250 GENERAL PHARMACY W/ HCPCS (ALT 636 FOR OP/ED): Mod: JZ | Performed by: PHYSICIAN ASSISTANT

## 2025-04-30 PROCEDURE — 36415 COLL VENOUS BLD VENIPUNCTURE: CPT | Performed by: PHYSICIAN ASSISTANT

## 2025-04-30 PROCEDURE — 86140 C-REACTIVE PROTEIN: CPT | Performed by: PHYSICIAN ASSISTANT

## 2025-04-30 PROCEDURE — 84702 CHORIONIC GONADOTROPIN TEST: CPT | Performed by: PHYSICIAN ASSISTANT

## 2025-04-30 RX ORDER — CEFAZOLIN SODIUM 2 G/50ML
2 SOLUTION INTRAVENOUS ONCE
Status: COMPLETED | OUTPATIENT
Start: 2025-04-30 | End: 2025-04-30

## 2025-04-30 RX ADMIN — CEFAZOLIN SODIUM 2 G: 2 SOLUTION INTRAVENOUS at 14:02

## 2025-04-30 ASSESSMENT — PAIN DESCRIPTION - ORIENTATION: ORIENTATION: RIGHT;UPPER

## 2025-04-30 ASSESSMENT — PAIN SCALES - GENERAL: PAINLEVEL_OUTOF10: 8

## 2025-04-30 ASSESSMENT — PAIN - FUNCTIONAL ASSESSMENT: PAIN_FUNCTIONAL_ASSESSMENT: 0-10

## 2025-04-30 ASSESSMENT — PAIN DESCRIPTION - LOCATION: LOCATION: LEG

## 2025-04-30 ASSESSMENT — PAIN DESCRIPTION - PAIN TYPE: TYPE: ACUTE PAIN

## 2025-04-30 NOTE — ED TRIAGE NOTES
Pt to ED c/o infected tattoo to right upper thigh. Tattoo done 4/25, placed on Keflex has taken 9 doses and wound is not getting better. Pt c/o severe pain. Denies drainage.

## 2025-04-30 NOTE — ED PROVIDER NOTES
EMERGENCY MEDICINE EVALUATION NOTE    History of Present Illness     Chief Complaint:   Chief Complaint   Patient presents with    Wound Infection       HPI: Cari Forde is a 24 y.o. female presents with a chief complaint of concern for wound infection.  Patient reports that 5 days ago she got a tattoo on her right thigh.  She states that it was painful and it became very red and warm to the touch.  She states she went to urgent care on Monday where she was given Keflex.  She reports that she is taken 2 days of the Keflex and has noticed an improvement in the redness but not in the pain in the area.  Patient concerns for infection with spreading and urgent care recommended she come here for evaluation.  Patient denies any fevers or chills.  Denies chance of pregnancy.  Patient denies any significant past medical history other than diabetes which she takes metformin for.  She states that she has no antibiotic allergies.    Previous History   Medical History[1]  Surgical History[2]  Social History[3]  Family History[4]  Allergies[5]  Current Outpatient Medications   Medication Instructions    escitalopram (LEXAPRO) 20 mg, oral, Daily    lidocaine (XYLOCAINE) 4 % gel gel 1 Application, Topical, As needed    lidocaine HCl-hydrocortison ac 3-0.5 % cream Insert 1 application into the rectum twice daily as needed for up to 7 days    magnesium glycinate 100 mg, oral, Daily    metFORMIN XR (GLUCOPHAGE-XR) 500 mg, oral, Daily with evening meal, Do not crush, chew, or split.    riboflavin (VITAMIN B2) 100 mg, oral, Daily       Physical Exam     Appearance: Alert, oriented , cooperative,  in no acute distress.      Skin: Intact,  dry skin, no lesions, rash, petechiae or purpura.  New healing tattoo on right thigh.  Minimal tenderness to the area.  No obvious erythema or drainage noted on examination.     Eyes: PERRLA, EOMs intact,  Conjunctiva pink      ENT: Hearing grossly intact. Pharynx clear     Neck: Supple. Trachea at  midline.      Pulmonary: Clear bilaterally. No rales, rhonchi or wheezing. No accessory muscle use or stridor.     Cardiac: Normal rate and rhythm without murmur     Abdomen: Soft, nontender, active bowel sounds.     Musculoskeletal: Full range of motion.      Neurological:Cranial nerves II through XII are grossly intact, normal sensation, no weakness, no focal findings identified.     Results     Labs Reviewed   CBC WITH AUTO DIFFERENTIAL - Abnormal       Result Value    WBC 7.8      nRBC 0.0      RBC 5.22 (*)     Hemoglobin 15.1      Hematocrit 44.6      MCV 85      MCH 28.9      MCHC 33.9      RDW 12.8      Platelets 310      Neutrophils % 46.0      Immature Granulocytes %, Automated 0.1      Lymphocytes % 46.2      Monocytes % 6.0      Eosinophils % 1.3      Basophils % 0.4      Neutrophils Absolute 3.60      Immature Granulocytes Absolute, Automated 0.01      Lymphocytes Absolute 3.61      Monocytes Absolute 0.47      Eosinophils Absolute 0.10      Basophils Absolute 0.03     COMPREHENSIVE METABOLIC PANEL - Normal    Glucose 94      Sodium 137      Potassium 3.9      Chloride 103      Bicarbonate 27      Anion Gap 11      Urea Nitrogen 12      Creatinine 0.97      eGFR 84      Calcium 9.5      Albumin 4.6      Alkaline Phosphatase 87      Total Protein 7.6      AST 24      Bilirubin, Total 0.6      ALT 37     C-REACTIVE PROTEIN - Normal    C-Reactive Protein 0.89     SEDIMENTATION RATE, AUTOMATED - Normal    Sedimentation Rate 16     LACTATE - Normal    Lactate 1.3      Narrative:     Venipuncture immediately after or during the administration of Metamizole may lead to falsely low results. Testing should be performed immediately prior to Metamizole dosing.   HUMAN CHORIONIC GONADOTROPIN, SERUM QUANTITATIVE - Normal    HCG, Beta-Quantitative <2      Narrative:      Total HCG measurement is performed using the [employer] Access   Immunoassay which detects intact HCG and free beta HCG subunit.    This test is  "not indicated for use as a tumor marker.   HCG testing is performed using a different test methodology at [facility] than other [facility] hospitals. Direct result comparison   should only be made within the same method.         No orders to display         ED Course & Medical Decision Making     Medications   ceFAZolin (Ancef) 2 g in dextrose (iso) IV 50 mL (0 g intravenous Stopped 4/30/25 1434)     Heart Rate:  [98]   Temperature:  [36.1 °C (97 °F)]   Respirations:  [18]   BP: (144)/(104)   Height:  [157.5 cm (5' 2\")]   Weight:  [86.2 kg (190 lb)]   Pulse Ox:  [98 %]    ED Course as of 04/30/25 1437   Wed Apr 30, 2025   1330 I discussed with the patient.  Patient's wound does not appear to be overtly infected.  She states she was instructed to come to the ER with any worsening symptoms after being seen in the urgent care after taking antibiotics for 2 days.  I informed her she technically has not failed outpatient treatment since she is here we will give her dose of IV antibiotics and run basic labs to ensure that there is no acute serious infection going on.  Patient agreeable to this plan of care. [CJ]   1435 Reevaluated the patient at this time.  We discussed plan of care going forward.  Patient CBC and CMP were within normal limits CRP sed rate were negative lactate was negative.  Quant was negative.  I have to the patient at this time I do not think she is failed outpatient treatment especially since the wound is getting better despite the pain being worse.  Patient did receive IV Ancef here.  She is agreeable to plan of care for discharge at this time.  Patient will be given naproxen at home for anti-inflammatories.  She was encouraged to follow-up with primary care provider or return here with worsening symptoms.  She is agreeable to plan of care of discharge at this time. [CJ]      ED Course User Index  [CJ] Yang Flores PA-C         Diagnoses as of 04/30/25 1437   Tattoo reaction "       Procedures   Procedures    Diagnosis     1. Tattoo reaction        Disposition   Discharged     ED Prescriptions    None         Disclaimer: This note was dictated by speech recognition. Minor errors in transcription may be present. Please call if questions.         [1]   Past Medical History:  Diagnosis Date    Depression     Diabetes (Multi)     Kidney stone 9/22/23    Rectal bleeding    [2]   Past Surgical History:  Procedure Laterality Date    EXCISIONAL HEMORRHOIDECTOMY  04/08/2025    WISDOM TOOTH EXTRACTION     [3]   Social History  Tobacco Use    Smoking status: Never    Smokeless tobacco: Never   Vaping Use    Vaping status: Former    Substances: Nicotine    Devices: Disposable   Substance Use Topics    Alcohol use: Not Currently     Alcohol/week: 4.0 standard drinks of alcohol     Types: 2 Glasses of wine, 2 Standard drinks or equivalent per week     Comment: occasionally    Drug use: Never   [4]   Family History  Problem Relation Name Age of Onset    Hypertension Mother Lidia Forde     Breast cancer Mother Lidia Forde     Diabetes Maternal Grandmother Gabby     Hypertension Maternal Grandmother Gabby     Breast cancer Maternal Grandmother Gabby    [5] No Known Allergies       Yang Flores PA-C  04/30/25 2320

## 2025-05-05 ENCOUNTER — APPOINTMENT (OUTPATIENT)
Dept: SURGERY | Facility: CLINIC | Age: 25
End: 2025-05-05
Payer: COMMERCIAL

## 2025-05-05 VITALS
HEIGHT: 62 IN | HEART RATE: 81 BPM | SYSTOLIC BLOOD PRESSURE: 108 MMHG | WEIGHT: 211.6 LBS | BODY MASS INDEX: 38.94 KG/M2 | DIASTOLIC BLOOD PRESSURE: 76 MMHG | OXYGEN SATURATION: 96 %

## 2025-05-05 DIAGNOSIS — K64.4 INTERNAL AND EXTERNAL BLEEDING HEMORRHOIDS: Primary | ICD-10-CM

## 2025-05-05 DIAGNOSIS — K64.8 INTERNAL AND EXTERNAL BLEEDING HEMORRHOIDS: Primary | ICD-10-CM

## 2025-05-05 DIAGNOSIS — K59.00 CONSTIPATION, UNSPECIFIED CONSTIPATION TYPE: ICD-10-CM

## 2025-05-05 PROCEDURE — 3008F BODY MASS INDEX DOCD: CPT | Performed by: SURGERY

## 2025-05-05 PROCEDURE — 99024 POSTOP FOLLOW-UP VISIT: CPT | Performed by: SURGERY

## 2025-05-05 PROCEDURE — 1036F TOBACCO NON-USER: CPT | Performed by: SURGERY

## 2025-05-05 ASSESSMENT — ENCOUNTER SYMPTOMS
ABDOMINAL PAIN: 0
HEADACHES: 0
CHILLS: 0
ANAL BLEEDING: 0
FEVER: 0
BLOOD IN STOOL: 0
SHORTNESS OF BREATH: 0
VOMITING: 0
DIZZINESS: 0
CONSTIPATION: 0
RECTAL PAIN: 0
COUGH: 0
DIARRHEA: 0
NAUSEA: 0
PALPITATIONS: 0

## 2025-05-05 NOTE — PROGRESS NOTES
GENERAL SURGERY CLINIC NOTE    Cari Forde   2000   [id number]     History Of Present Illness  Cari Forde is a 24 y.o. female who presents to the office for follow up after undergoing rectal examination under anesthesia and hemorrhoidectomy on 4/8/25. She had some bleeding and saw Dr. Alvarado 4/11/25 in the office. It improved significantly since then. She denies pain or bleeding.      She is a  here at [facility].     Past Medical History  She has a past medical history of Depression, Diabetes (Multi), Kidney stone (9/22/23), and Rectal bleeding.    She has no past medical history of Personal history of irradiation.    Surgical History  She has a past surgical history that includes Upper Tract tooth extraction and Excisional hemorrhoidectomy (04/08/2025).    Medications  Current Outpatient Medications on File Prior to Visit   Medication Sig Dispense Refill    escitalopram (Lexapro) 20 mg tablet Take 1 tablet (20 mg) by mouth once daily. 90 tablet 3    magnesium glycinate 100 mg magnesium capsule Take 1 capsule (100 mg) by mouth once daily. 100 capsule 3    metFORMIN  mg 24 hr tablet Take 1 tablet (500 mg) by mouth once daily in the evening. Take with meals. Do not crush, chew, or split. 30 tablet 11    riboflavin (vitamin B2) 100 mg tablet tablet Take 1 tablet (100 mg) by mouth once daily. 100 tablet 3    lidocaine (XYLOCAINE) 4 % gel gel Apply 1 Application topically if needed for pain. 30 g 0    lidocaine HCl-hydrocortison ac 3-0.5 % cream Insert 1 application into the rectum twice daily as needed for up to 7 days 28.35 g 0     No current facility-administered medications on file prior to visit.       Allergies  Patient has no known allergies.     Social History  She reports that she has never smoked. She has never used smokeless tobacco. She reports that she does not currently use alcohol after a past usage of about 4.0 standard drinks of alcohol per week. She reports that she does not  "use drugs.    Family History  Family History   Problem Relation Name Age of Onset    Hypertension Mother      Breast cancer Mother      Diabetes Maternal Grandmother      Hypertension Maternal Grandmother      Breast cancer Maternal Grandmother          Review of Systems   Constitutional:  Negative for chills and fever.   Respiratory:  Negative for cough and shortness of breath.    Cardiovascular:  Negative for chest pain and palpitations.   Gastrointestinal:  Negative for abdominal pain, anal bleeding, blood in stool, constipation, diarrhea, nausea, rectal pain and vomiting.   Neurological:  Negative for dizziness and headaches.   All other systems reviewed and are negative.      Last Recorded Vitals  Blood pressure 108/76, pulse 81, height 1.575 m (5' 2\"), weight 96 kg (211 lb 9.6 oz), SpO2 96%, not currently breastfeeding.     Physical Exam  Constitutional:       General: She is not in acute distress.     Appearance: Normal appearance. She is not ill-appearing.   HENT:      Head: Normocephalic and atraumatic.   Cardiovascular:      Rate and Rhythm: Normal rate and regular rhythm.   Pulmonary:      Effort: Pulmonary effort is normal. No respiratory distress.      Breath sounds: Normal breath sounds.   Musculoskeletal:         General: No swelling.   Skin:     General: Skin is warm and dry.   Neurological:      Mental Status: She is alert and oriented to person, place, and time. Mental status is at baseline.   Psychiatric:         Mood and Affect: Mood normal.         Behavior: Behavior normal.          Relevant Results    Surgical pathology 4/8/25  FINAL DIAGNOSIS      ANAL TISSUE, LEFT LATERAL GROUP, EXCISION:              Inflamed hemorrhoidal skin tag.     Assessment and Plan  24 y.o. female with a symptomatic left lateral external hemorrhoid in the setting of straining with bowel movements, now status post hemorrhoidectomy 4/8/25. She had some mild bleeding immediately postop that has since resolved. I " reviewed the pathology results with her. I discussed the importance of prevention in the future, as hemorrhoids can return. Follow up on an as needed basis. She expressed her understanding and all questions were answered.     Sarah Iniguez MD, [facility]  General Surgery

## 2025-05-12 ENCOUNTER — APPOINTMENT (OUTPATIENT)
Dept: SURGERY | Facility: CLINIC | Age: 25
End: 2025-05-12
Payer: COMMERCIAL

## 2025-05-14 ENCOUNTER — APPOINTMENT (OUTPATIENT)
Dept: SURGICAL ONCOLOGY | Facility: CLINIC | Age: 25
End: 2025-05-14
Payer: COMMERCIAL

## 2025-05-14 DIAGNOSIS — L30.9 DERMATITIS: ICD-10-CM

## 2025-05-14 DIAGNOSIS — L30.9 DERMATITIS: Primary | ICD-10-CM

## 2025-05-14 RX ORDER — TRIAMCINOLONE ACETONIDE 1 MG/G
CREAM TOPICAL 2 TIMES DAILY
Qty: 30 G | Refills: 0 | Status: SHIPPED | OUTPATIENT
Start: 2025-05-14 | End: 2025-05-14 | Stop reason: SDUPTHER

## 2025-05-14 RX ORDER — TRIAMCINOLONE ACETONIDE 1 MG/G
CREAM TOPICAL 2 TIMES DAILY
Qty: 30 G | Refills: 0 | Status: SHIPPED | OUTPATIENT
Start: 2025-05-14

## 2025-05-24 DIAGNOSIS — E16.1 INCREASED INSULIN LEVEL: ICD-10-CM

## 2025-05-27 ENCOUNTER — PHARMACY VISIT (OUTPATIENT)
Dept: PHARMACY | Facility: CLINIC | Age: 25
End: 2025-05-27
Payer: MEDICAID

## 2025-05-27 DIAGNOSIS — Z30.41 ENCOUNTER FOR SURVEILLANCE OF CONTRACEPTIVE PILLS: Primary | ICD-10-CM

## 2025-05-27 PROCEDURE — RXMED WILLOW AMBULATORY MEDICATION CHARGE

## 2025-05-27 RX ORDER — DROSPIRENONE AND ETHINYL ESTRADIOL 0.02-3(28)
1 KIT ORAL DAILY
Qty: 84 TABLET | Refills: 3 | Status: SHIPPED | OUTPATIENT
Start: 2025-05-27 | End: 2025-05-27 | Stop reason: SINTOL

## 2025-05-27 RX ORDER — DROSPIRENONE AND ETHINYL ESTRADIOL 0.02-3(28)
1 KIT ORAL DAILY
Qty: 84 TABLET | Refills: 3 | Status: CANCELLED | OUTPATIENT
Start: 2025-05-27 | End: 2026-05-27

## 2025-05-27 NOTE — TELEPHONE ENCOUNTER
Cari called stating she went to get her birth control and it was no longer on her medication list. She is unable to get it. Pharmacy is up to date. Thank you

## 2025-05-27 NOTE — TELEPHONE ENCOUNTER
Patient would like to go on Dinah birth control pills.   Nexplanon was removed 1/14/2025, she states that she has not been on any birth control since the Nexplanon.   Medication pended.

## 2025-05-27 NOTE — TELEPHONE ENCOUNTER
Patient states she has not had a regular period in many years, even before the Nexplanon. Her recent Nexplanon was removed 1/2025, she has not had a period since. I talked with Babita who states the birth control needs to be discontinued and patient needs a full appointment to discuss this and labs / imaging may be ordered as needed. Patient agreed and scheduled for 6/17

## 2025-05-27 NOTE — TELEPHONE ENCOUNTER
Patient states she has further questions because she hasn't started a period, wants to make sure she should still start medication?

## 2025-05-27 NOTE — TELEPHONE ENCOUNTER
Dinah ordered for her to begin the Sunday after her next period.  She also will need to schedule an annual exam.

## 2025-06-02 ENCOUNTER — PHARMACY VISIT (OUTPATIENT)
Dept: PHARMACY | Facility: CLINIC | Age: 25
End: 2025-06-02
Payer: MEDICAID

## 2025-06-02 ENCOUNTER — HOSPITAL ENCOUNTER (EMERGENCY)
Facility: HOSPITAL | Age: 25
Discharge: HOME | End: 2025-06-02
Attending: EMERGENCY MEDICINE
Payer: COMMERCIAL

## 2025-06-02 ENCOUNTER — APPOINTMENT (OUTPATIENT)
Dept: RADIOLOGY | Facility: HOSPITAL | Age: 25
End: 2025-06-02
Payer: COMMERCIAL

## 2025-06-02 ENCOUNTER — APPOINTMENT (OUTPATIENT)
Dept: CARDIOLOGY | Facility: HOSPITAL | Age: 25
End: 2025-06-02
Payer: COMMERCIAL

## 2025-06-02 VITALS
WEIGHT: 190 LBS | SYSTOLIC BLOOD PRESSURE: 106 MMHG | DIASTOLIC BLOOD PRESSURE: 72 MMHG | HEIGHT: 61 IN | RESPIRATION RATE: 18 BRPM | HEART RATE: 75 BPM | BODY MASS INDEX: 35.87 KG/M2 | TEMPERATURE: 97.7 F | OXYGEN SATURATION: 100 %

## 2025-06-02 DIAGNOSIS — R07.9 CHEST PAIN, UNSPECIFIED TYPE: Primary | ICD-10-CM

## 2025-06-02 DIAGNOSIS — R11.2 NAUSEA AND VOMITING, UNSPECIFIED VOMITING TYPE: ICD-10-CM

## 2025-06-02 LAB
ALBUMIN SERPL BCP-MCNC: 4.5 G/DL (ref 3.4–5)
ALP SERPL-CCNC: 83 U/L (ref 33–110)
ALT SERPL W P-5'-P-CCNC: 46 U/L (ref 7–45)
ANION GAP SERPL CALC-SCNC: 11 MMOL/L (ref 10–20)
APPEARANCE UR: CLEAR
AST SERPL W P-5'-P-CCNC: 28 U/L (ref 9–39)
BACTERIA #/AREA URNS AUTO: ABNORMAL /HPF
BASOPHILS # BLD AUTO: 0.04 X10*3/UL (ref 0–0.1)
BASOPHILS NFR BLD AUTO: 0.5 %
BILIRUB SERPL-MCNC: 0.6 MG/DL (ref 0–1.2)
BILIRUB UR STRIP.AUTO-MCNC: NEGATIVE MG/DL
BUN SERPL-MCNC: 11 MG/DL (ref 6–23)
CALCIUM SERPL-MCNC: 9.3 MG/DL (ref 8.6–10.3)
CARDIAC TROPONIN I PNL SERPL HS: <3 NG/L (ref 0–13)
CHLORIDE SERPL-SCNC: 102 MMOL/L (ref 98–107)
CO2 SERPL-SCNC: 27 MMOL/L (ref 21–32)
COLOR UR: COLORLESS
CREAT SERPL-MCNC: 0.81 MG/DL (ref 0.5–1.05)
EGFRCR SERPLBLD CKD-EPI 2021: >90 ML/MIN/1.73M*2
EOSINOPHIL # BLD AUTO: 0.09 X10*3/UL (ref 0–0.7)
EOSINOPHIL NFR BLD AUTO: 1.1 %
ERYTHROCYTE [DISTWIDTH] IN BLOOD BY AUTOMATED COUNT: 12.7 % (ref 11.5–14.5)
GLUCOSE SERPL-MCNC: 89 MG/DL (ref 74–99)
GLUCOSE UR STRIP.AUTO-MCNC: NORMAL MG/DL
HCG UR QL IA.RAPID: NEGATIVE
HCT VFR BLD AUTO: 43.2 % (ref 36–46)
HGB BLD-MCNC: 14.6 G/DL (ref 12–16)
HOLD SPECIMEN: NORMAL
IMM GRANULOCYTES # BLD AUTO: 0.01 X10*3/UL (ref 0–0.7)
IMM GRANULOCYTES NFR BLD AUTO: 0.1 % (ref 0–0.9)
KETONES UR STRIP.AUTO-MCNC: NEGATIVE MG/DL
LEUKOCYTE ESTERASE UR QL STRIP.AUTO: NEGATIVE
LYMPHOCYTES # BLD AUTO: 3.58 X10*3/UL (ref 1.2–4.8)
LYMPHOCYTES NFR BLD AUTO: 44.4 %
MCH RBC QN AUTO: 29.3 PG (ref 26–34)
MCHC RBC AUTO-ENTMCNC: 33.8 G/DL (ref 32–36)
MCV RBC AUTO: 87 FL (ref 80–100)
MONOCYTES # BLD AUTO: 0.64 X10*3/UL (ref 0.1–1)
MONOCYTES NFR BLD AUTO: 7.9 %
NEUTROPHILS # BLD AUTO: 3.71 X10*3/UL (ref 1.2–7.7)
NEUTROPHILS NFR BLD AUTO: 46 %
NITRITE UR QL STRIP.AUTO: NEGATIVE
NRBC BLD-RTO: 0 /100 WBCS (ref 0–0)
PH UR STRIP.AUTO: 7 [PH]
PLATELET # BLD AUTO: 316 X10*3/UL (ref 150–450)
POTASSIUM SERPL-SCNC: 3.9 MMOL/L (ref 3.5–5.3)
PROT SERPL-MCNC: 7.2 G/DL (ref 6.4–8.2)
PROT UR STRIP.AUTO-MCNC: NEGATIVE MG/DL
RBC # BLD AUTO: 4.99 X10*6/UL (ref 4–5.2)
RBC # UR STRIP.AUTO: ABNORMAL MG/DL
RBC #/AREA URNS AUTO: ABNORMAL /HPF
SODIUM SERPL-SCNC: 136 MMOL/L (ref 136–145)
SP GR UR STRIP.AUTO: 1.01
SQUAMOUS #/AREA URNS AUTO: ABNORMAL /HPF
UROBILINOGEN UR STRIP.AUTO-MCNC: NORMAL MG/DL
WBC # BLD AUTO: 8.1 X10*3/UL (ref 4.4–11.3)
WBC #/AREA URNS AUTO: ABNORMAL /HPF

## 2025-06-02 PROCEDURE — 2500000004 HC RX 250 GENERAL PHARMACY W/ HCPCS (ALT 636 FOR OP/ED): Performed by: EMERGENCY MEDICINE

## 2025-06-02 PROCEDURE — 96375 TX/PRO/DX INJ NEW DRUG ADDON: CPT

## 2025-06-02 PROCEDURE — 74177 CT ABD & PELVIS W/CONTRAST: CPT | Performed by: RADIOLOGY

## 2025-06-02 PROCEDURE — 96374 THER/PROPH/DIAG INJ IV PUSH: CPT | Mod: 59

## 2025-06-02 PROCEDURE — 81025 URINE PREGNANCY TEST: CPT | Performed by: EMERGENCY MEDICINE

## 2025-06-02 PROCEDURE — 99285 EMERGENCY DEPT VISIT HI MDM: CPT | Mod: 25 | Performed by: EMERGENCY MEDICINE

## 2025-06-02 PROCEDURE — 85025 COMPLETE CBC W/AUTO DIFF WBC: CPT | Performed by: EMERGENCY MEDICINE

## 2025-06-02 PROCEDURE — RXMED WILLOW AMBULATORY MEDICATION CHARGE

## 2025-06-02 PROCEDURE — 2550000001 HC RX 255 CONTRASTS: Mod: JZ | Performed by: EMERGENCY MEDICINE

## 2025-06-02 PROCEDURE — 96361 HYDRATE IV INFUSION ADD-ON: CPT

## 2025-06-02 PROCEDURE — 74177 CT ABD & PELVIS W/CONTRAST: CPT

## 2025-06-02 PROCEDURE — 80053 COMPREHEN METABOLIC PANEL: CPT | Performed by: EMERGENCY MEDICINE

## 2025-06-02 PROCEDURE — 36415 COLL VENOUS BLD VENIPUNCTURE: CPT | Performed by: EMERGENCY MEDICINE

## 2025-06-02 PROCEDURE — 81001 URINALYSIS AUTO W/SCOPE: CPT | Performed by: EMERGENCY MEDICINE

## 2025-06-02 PROCEDURE — 71275 CT ANGIOGRAPHY CHEST: CPT

## 2025-06-02 PROCEDURE — 93005 ELECTROCARDIOGRAM TRACING: CPT

## 2025-06-02 PROCEDURE — 71275 CT ANGIOGRAPHY CHEST: CPT | Performed by: RADIOLOGY

## 2025-06-02 PROCEDURE — 84484 ASSAY OF TROPONIN QUANT: CPT | Performed by: EMERGENCY MEDICINE

## 2025-06-02 RX ORDER — ONDANSETRON HYDROCHLORIDE 2 MG/ML
4 INJECTION, SOLUTION INTRAVENOUS ONCE
Status: COMPLETED | OUTPATIENT
Start: 2025-06-02 | End: 2025-06-02

## 2025-06-02 RX ORDER — KETOROLAC TROMETHAMINE 30 MG/ML
15 INJECTION, SOLUTION INTRAMUSCULAR; INTRAVENOUS ONCE
Status: COMPLETED | OUTPATIENT
Start: 2025-06-02 | End: 2025-06-02

## 2025-06-02 RX ORDER — ONDANSETRON 4 MG/1
4 TABLET, FILM COATED ORAL EVERY 6 HOURS
Qty: 12 TABLET | Refills: 0 | Status: SHIPPED | OUTPATIENT
Start: 2025-06-02 | End: 2025-06-05

## 2025-06-02 RX ADMIN — IOHEXOL 75 ML: 350 INJECTION, SOLUTION INTRAVENOUS at 14:48

## 2025-06-02 RX ADMIN — KETOROLAC TROMETHAMINE 15 MG: 30 INJECTION, SOLUTION INTRAMUSCULAR at 12:53

## 2025-06-02 RX ADMIN — SODIUM CHLORIDE 500 ML: 0.9 INJECTION, SOLUTION INTRAVENOUS at 12:18

## 2025-06-02 RX ADMIN — ONDANSETRON 4 MG: 2 INJECTION, SOLUTION INTRAMUSCULAR; INTRAVENOUS at 12:18

## 2025-06-02 ASSESSMENT — LIFESTYLE VARIABLES
EVER FELT BAD OR GUILTY ABOUT YOUR DRINKING: NO
TOTAL SCORE: 0
HAVE PEOPLE ANNOYED YOU BY CRITICIZING YOUR DRINKING: NO
EVER HAD A DRINK FIRST THING IN THE MORNING TO STEADY YOUR NERVES TO GET RID OF A HANGOVER: NO
HAVE YOU EVER FELT YOU SHOULD CUT DOWN ON YOUR DRINKING: NO

## 2025-06-02 ASSESSMENT — COLUMBIA-SUICIDE SEVERITY RATING SCALE - C-SSRS
2. HAVE YOU ACTUALLY HAD ANY THOUGHTS OF KILLING YOURSELF?: NO
6. HAVE YOU EVER DONE ANYTHING, STARTED TO DO ANYTHING, OR PREPARED TO DO ANYTHING TO END YOUR LIFE?: NO
1. IN THE PAST MONTH, HAVE YOU WISHED YOU WERE DEAD OR WISHED YOU COULD GO TO SLEEP AND NOT WAKE UP?: NO

## 2025-06-02 ASSESSMENT — PAIN DESCRIPTION - PAIN TYPE: TYPE: ACUTE PAIN

## 2025-06-02 ASSESSMENT — PAIN SCALES - GENERAL
PAINLEVEL_OUTOF10: 6
PAINLEVEL_OUTOF10: 0 - NO PAIN

## 2025-06-02 ASSESSMENT — PAIN - FUNCTIONAL ASSESSMENT
PAIN_FUNCTIONAL_ASSESSMENT: 0-10
PAIN_FUNCTIONAL_ASSESSMENT: 0-10

## 2025-06-02 ASSESSMENT — PAIN DESCRIPTION - LOCATION: LOCATION: CHEST

## 2025-06-02 ASSESSMENT — PAIN DESCRIPTION - DESCRIPTORS: DESCRIPTORS: HEAVINESS

## 2025-06-02 ASSESSMENT — PAIN DESCRIPTION - FREQUENCY: FREQUENCY: INTERMITTENT

## 2025-06-02 NOTE — ED PROVIDER NOTES
HPI   Chief Complaint   Patient presents with    Chest Pain       Patient presents emergency department secondary to chest pain and nausea.  Patient had acute onset chest pain and nausea and vomiting today while at work.  She feels like when she stands up that she is going to pass out.  No recent travel or surgery.  No leg pain or swelling.  Minimal abdominal discomfort.  She denies pregnancy.  She currently is on her menstrual period.  No history of similar symptoms.  No history of cardiac disease.  No other complaints at this time.                          Tasha Coma Scale Score: 15                  Patient History   Medical History[1]  Surgical History[2]  Family History[3]  Social History[4]    Physical Exam   ED Triage Vitals [06/02/25 1140]   Temperature Heart Rate Respirations BP   36.5 °C (97.7 °F) 97 20 (!) 128/91      Pulse Ox Temp Source Heart Rate Source Patient Position   99 % Skin -- --      BP Location FiO2 (%)     -- --       Physical Exam  Vitals and nursing note reviewed.   Constitutional:       Appearance: Normal appearance. She is well-developed.   HENT:      Head: Normocephalic.      Right Ear: Tympanic membrane normal.      Left Ear: Tympanic membrane normal.      Nose: Nose normal.      Mouth/Throat:      Mouth: Mucous membranes are moist.   Eyes:      Extraocular Movements: Extraocular movements intact.      Pupils: Pupils are equal, round, and reactive to light.   Cardiovascular:      Rate and Rhythm: Normal rate and regular rhythm.      Pulses: Normal pulses.      Heart sounds: Normal heart sounds. No murmur heard.  Pulmonary:      Effort: Pulmonary effort is normal. No tachypnea, accessory muscle usage or respiratory distress.      Breath sounds: Normal breath sounds. No decreased breath sounds, wheezing, rhonchi or rales.   Chest:      Chest wall: No tenderness or crepitus.   Abdominal:      General: Abdomen is flat. Bowel sounds are normal.      Palpations: Abdomen is soft.    Musculoskeletal:         General: Normal range of motion.      Cervical back: Normal range of motion.   Skin:     General: Skin is warm and dry.      Capillary Refill: Capillary refill takes less than 2 seconds.   Neurological:      General: No focal deficit present.      Mental Status: She is alert and oriented to person, place, and time.   Psychiatric:         Mood and Affect: Mood normal.         Behavior: Behavior normal.       Labs Reviewed - No data to display  Pain Management Panel           No data to display              No orders to display     ED Course & MDM   Diagnoses as of 06/02/25 1537   Chest pain, unspecified type   Nausea and vomiting, unspecified vomiting type       Medical Decision Making  Patient presents to the emergency department secondary to chest heaviness, nausea and vomiting and presyncope symptoms.  Patient is evaluated in the emergency department for dysrhythmia, electrolyte abnormality, PE or other acute cause.  Patient had EKG performed 11:46 AM.  It is sinus rhythm rate of 84.  No acute ST elevation.  NY interval is 141 and QTc is 438.  Laboratory workup including CBC CMP and troponin are obtained.  CT angiogram of the chest is obtained.  Patient is given 15 mg of IV Toradol and 4 mg of IV Zofran for pain and nausea.  CT angiogram shows no evidence of PE.  CT abdomen pelvis with IV contrast shows no evidence of intra-abdominal process.  Troponin is negative.  Laboratory workup overall is unremarkable other than a minimally elevated ALT of 46.  No leukocytosis.  Patient feels improved after treatment.  She is stable for discharge.  She will follow-up with primary care or return here for any worsening symptoms.        Procedure  Procedures         [1]   Past Medical History:  Diagnosis Date    Depression     Diabetes (Multi)     Kidney stone 9/22/23    Rectal bleeding    [2]   Past Surgical History:  Procedure Laterality Date    EXCISIONAL HEMORRHOIDECTOMY  04/08/2025    NICOLÁS  TOOTH EXTRACTION     [3]   Family History  Problem Relation Name Age of Onset    Hypertension Mother      Breast cancer Mother      Diabetes Maternal Grandmother      Hypertension Maternal Grandmother      Breast cancer Maternal Grandmother     [4]   Social History  Tobacco Use    Smoking status: Never    Smokeless tobacco: Never   Vaping Use    Vaping status: Former    Substances: Nicotine    Devices: Disposable   Substance Use Topics    Alcohol use: Not Currently     Alcohol/week: 4.0 standard drinks of alcohol     Types: 2 Glasses of wine, 2 Standard drinks or equivalent per week     Comment: occasionally    Drug use: Never        Patrica Montanez MD  06/02/25 153

## 2025-06-02 NOTE — ED TRIAGE NOTES
Pt presents for chest heaviness and feeling like she was going to pass out for 1 week. Also has nausea and vomiting. Has gotten worse today. She states that she notices the chest heaviness when she stands. Heaviness comes and goes. States chills. Denies fever.

## 2025-06-03 LAB
ATRIAL RATE: 85 BPM
P AXIS: 48 DEGREES
PR INTERVAL: 141 MS
Q ONSET: 251 MS
QRS COUNT: 13 BEATS
QRS DURATION: 94 MS
QT INTERVAL: 370 MS
QTC CALCULATION(BAZETT): 438 MS
QTC FREDERICIA: 413 MS
R AXIS: 35 DEGREES
T AXIS: 53 DEGREES
T OFFSET: 436 MS
VENTRICULAR RATE: 84 BPM

## 2025-06-06 ENCOUNTER — APPOINTMENT (OUTPATIENT)
Dept: PRIMARY CARE | Facility: CLINIC | Age: 25
End: 2025-06-06
Payer: COMMERCIAL

## 2025-06-06 ENCOUNTER — PHARMACY VISIT (OUTPATIENT)
Dept: PHARMACY | Facility: CLINIC | Age: 25
End: 2025-06-06
Payer: MEDICAID

## 2025-06-06 VITALS
DIASTOLIC BLOOD PRESSURE: 76 MMHG | HEIGHT: 61 IN | WEIGHT: 213 LBS | SYSTOLIC BLOOD PRESSURE: 106 MMHG | BODY MASS INDEX: 40.22 KG/M2 | OXYGEN SATURATION: 98 % | HEART RATE: 89 BPM

## 2025-06-06 DIAGNOSIS — R42 DIZZINESS: ICD-10-CM

## 2025-06-06 DIAGNOSIS — G43.009 MIGRAINE WITHOUT AURA AND WITHOUT STATUS MIGRAINOSUS, NOT INTRACTABLE: Primary | ICD-10-CM

## 2025-06-06 DIAGNOSIS — G44.229 CHRONIC TENSION-TYPE HEADACHE, NOT INTRACTABLE: Chronic | ICD-10-CM

## 2025-06-06 PROCEDURE — 3008F BODY MASS INDEX DOCD: CPT

## 2025-06-06 PROCEDURE — RXMED WILLOW AMBULATORY MEDICATION CHARGE

## 2025-06-06 PROCEDURE — 99213 OFFICE O/P EST LOW 20 MIN: CPT

## 2025-06-06 RX ORDER — PROPRANOLOL HYDROCHLORIDE 20 MG/1
20 TABLET ORAL 2 TIMES DAILY
Qty: 60 TABLET | Refills: 5 | Status: SHIPPED | OUTPATIENT
Start: 2025-06-06 | End: 2025-12-03

## 2025-06-06 RX ORDER — MECLIZINE HYDROCHLORIDE 25 MG/1
25 TABLET ORAL 3 TIMES DAILY PRN
Qty: 30 TABLET | Refills: 11 | Status: SHIPPED | OUTPATIENT
Start: 2025-06-06 | End: 2026-06-06

## 2025-06-06 ASSESSMENT — ENCOUNTER SYMPTOMS
HEADACHES: 1
LOSS OF SENSATION IN FEET: 0
DIZZINESS: 1
DEPRESSION: 0
OCCASIONAL FEELINGS OF UNSTEADINESS: 0

## 2025-06-06 ASSESSMENT — PATIENT HEALTH QUESTIONNAIRE - PHQ9
1. LITTLE INTEREST OR PLEASURE IN DOING THINGS: NOT AT ALL
SUM OF ALL RESPONSES TO PHQ9 QUESTIONS 1 AND 2: 0
2. FEELING DOWN, DEPRESSED OR HOPELESS: NOT AT ALL

## 2025-06-06 NOTE — PROGRESS NOTES
"Subjective   Patient ID: Cari Forde is a 24 y.o. female who presents for Follow-up (3 month migraine follow up. Patient has not seen any difference in the Amitriptyline. Cannot get into the neurologist until August. Patient has also had some ER visits since last OV. Still having dizziness since ER visit a few days ago.).    Past Medical, Surgical, and Family History reviewed and updated in chart.     Reviewed all medications by prescribing practitioner or clinical pharmacist (such as prescriptions, OTCs, herbal therapies and supplements) and documented in the medical record.    HPI   24 yof in office for headache follow up     Daily migraines and dizziness, eyes and ears. Headaches tend to come mid day. 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 Has only had n/v once from a headache. Takes tylenol and ibuprofen. Uses blue light glasses daily at work. Endorse water intake of 4 stanleys, states eating is regular.   Has not had vision checked.  Started a year ago, Was given muscle relaxer's and meloxicam for then thinking tension headaches and they have not helped.     6/6/25: Does have appointment with neurology but not until August.  Has not followed with eye doctor    Had nexpulon for 5 years, had out then had daughter put back in for a year then had it removed in January. Has not had a period since. Has follow up with GYN 6/16.   Headaches seem to correct    Review of Systems   Neurological:  Positive for dizziness and headaches.       Objective   /76   Pulse 89   Ht 1.549 m (5' 0.98\")   Wt 96.6 kg (213 lb)   SpO2 98%   BMI 40.27 kg/m²     Physical Exam  Constitutional:       Appearance: Normal appearance.   Cardiovascular:      Rate and Rhythm: Normal rate and regular rhythm.      Pulses: Normal pulses.      Heart sounds: Normal heart sounds.   Neurological:      Mental Status: She is alert and oriented " to person, place, and time.         Assessment/Plan   Problem List Items Addressed This Visit       Chronic tension-type headache, not intractable (Chronic)     Other Visit Diagnoses         Migraine without aura and without status migrainosus, not intractable    -  Primary    Relevant Medications    propranolol (Inderal) 20 mg tablet      Dizziness        Relevant Medications    meclizine (Antivert) 25 mg tablet

## 2025-06-06 NOTE — PATIENT INSTRUCTIONS
Different supplements that can help with hormone balance are DIM (diindolylmethane) anuj-inositolo, Vitamin D, Vitamin B complex, ashwagandha.    I did send over a prescription of propanolol for migraine prevention. You can wait until you follow with GYN before starting if they feel this is more hormone related.

## 2025-06-11 ENCOUNTER — APPOINTMENT (OUTPATIENT)
Dept: OBSTETRICS AND GYNECOLOGY | Facility: CLINIC | Age: 25
End: 2025-06-11
Payer: COMMERCIAL

## 2025-06-16 ENCOUNTER — APPOINTMENT (OUTPATIENT)
Dept: OBSTETRICS AND GYNECOLOGY | Facility: CLINIC | Age: 25
End: 2025-06-16
Payer: COMMERCIAL

## 2025-06-16 VITALS
SYSTOLIC BLOOD PRESSURE: 124 MMHG | HEIGHT: 60 IN | BODY MASS INDEX: 41.03 KG/M2 | DIASTOLIC BLOOD PRESSURE: 90 MMHG | WEIGHT: 209 LBS

## 2025-06-16 DIAGNOSIS — S20.02XA CONTUSION OF LEFT BREAST, INITIAL ENCOUNTER: ICD-10-CM

## 2025-06-16 DIAGNOSIS — N92.6 IRREGULAR MENSES: Primary | ICD-10-CM

## 2025-06-16 PROCEDURE — 1036F TOBACCO NON-USER: CPT | Performed by: NURSE PRACTITIONER

## 2025-06-16 PROCEDURE — 99214 OFFICE O/P EST MOD 30 MIN: CPT | Performed by: NURSE PRACTITIONER

## 2025-06-16 PROCEDURE — 3008F BODY MASS INDEX DOCD: CPT | Performed by: NURSE PRACTITIONER

## 2025-06-16 ASSESSMENT — ENCOUNTER SYMPTOMS
PSYCHIATRIC NEGATIVE: 1
CONSTITUTIONAL NEGATIVE: 1
RESPIRATORY NEGATIVE: 1
NEUROLOGICAL NEGATIVE: 1
GASTROINTESTINAL NEGATIVE: 1

## 2025-06-16 NOTE — PROGRESS NOTES
Subjective   Patient ID: Cari Forde is a 24 y.o. female who presents for Follow-up (Task from 5/27/2025 patient wanted to go back on birth control but has not had a period in many years even before removal of Nexplanon ( 1/2025). Negative urine hcg 6/2/2025. States 3 days ago she noticed bruising on left breast, no pain.).  24 year old here for complaints of having ongoing irregular menses.  She notes that she had irregular periods as long as she can remember.  She then had the arm implant with irregular bleeding.  Since the nexplanon was removed in January 2025 she has not had a period.  She has noticed that she had light spotting when she wipes a few times throughout the months.  She also has cramping every so often, but she has not noticed it be in a pattern.  She is concerned as to why she does not have a period regularly.   She also has concerns of having left breast bruising.  She denies any specific incident it was caused from.  She denies any lumps, nipple discharge and peeling skin.  She notes that the areas were purple/red in color but have started to change.         Review of Systems   Constitutional: Negative.    Respiratory: Negative.     Gastrointestinal: Negative.    Genitourinary:  Positive for menstrual problem.   Skin: Negative.    Neurological: Negative.    Psychiatric/Behavioral: Negative.         Objective   Physical Exam  Vitals reviewed.   Constitutional:       Appearance: Normal appearance. She is well-developed.   Pulmonary:      Effort: Pulmonary effort is normal. No respiratory distress.   Chest:   Breasts:     Breasts are symmetrical.      Right: Normal. No swelling, bleeding, inverted nipple, mass, nipple discharge, skin change or tenderness.      Left: Skin change present. No swelling, bleeding, inverted nipple, mass, nipple discharge or tenderness.          Comments: Brusing found on left breast, green/yellow/brown color  Abdominal:      Palpations: Abdomen is soft.    Genitourinary:     General: Normal vulva.      Exam position: Lithotomy position.      Pubic Area: No rash.       Labia:         Right: No rash, tenderness, lesion or injury.         Left: No rash, tenderness, lesion or injury.       Urethra: No prolapse, urethral pain, urethral swelling or urethral lesion.      Vagina: Normal.      Cervix: Normal.      Uterus: Normal.       Adnexa: Right adnexa normal and left adnexa normal.      Rectum: Normal.   Musculoskeletal:         General: Normal range of motion.   Lymphadenopathy:      Upper Body:      Right upper body: No supraclavicular, axillary or pectoral adenopathy.      Left upper body: No supraclavicular, axillary or pectoral adenopathy.   Skin:     General: Skin is warm and dry.   Neurological:      General: No focal deficit present.      Mental Status: She is alert and oriented to person, place, and time. Mental status is at baseline.   Psychiatric:         Attention and Perception: Attention and perception normal.         Mood and Affect: Mood and affect normal.         Speech: Speech normal.         Behavior: Behavior normal. Behavior is cooperative.         Thought Content: Thought content normal.         Judgment: Judgment normal.         Assessment/Plan   Problem List Items Addressed This Visit    None  Visit Diagnoses         Codes      Irregular menses    -  Primary N92.6    Relevant Orders    Estradiol    Follicle Stimulating Hormone    Luteinizing Hormone    TSH with reflex to Free T4 if abnormal    Testosterone    QUEST HCG, TOTAL, QN    Prolactin    QUEST INSULIN    US PELVIS TRANSABDOMINAL WITH TRANSVAGINAL      Contusion of left breast, initial encounter     S20.02XA        Blood work and ultrasound ordered, reviewed if normal the light bleeding can be her period and can start ocp.  If abnormal will treat accordingly  Follow up for annual exam, sooner as needed if problems arise.         ROLO Hernandez-CNP 06/16/25 3:26 PM

## 2025-06-17 ENCOUNTER — HOSPITAL ENCOUNTER (OUTPATIENT)
Dept: RADIOLOGY | Facility: HOSPITAL | Age: 25
Discharge: HOME | End: 2025-06-17
Payer: COMMERCIAL

## 2025-06-17 DIAGNOSIS — N92.6 IRREGULAR MENSES: ICD-10-CM

## 2025-06-17 PROCEDURE — 76856 US EXAM PELVIC COMPLETE: CPT

## 2025-06-17 PROCEDURE — 76856 US EXAM PELVIC COMPLETE: CPT | Performed by: STUDENT IN AN ORGANIZED HEALTH CARE EDUCATION/TRAINING PROGRAM

## 2025-06-17 PROCEDURE — 76830 TRANSVAGINAL US NON-OB: CPT | Performed by: STUDENT IN AN ORGANIZED HEALTH CARE EDUCATION/TRAINING PROGRAM

## 2025-06-18 ENCOUNTER — PATIENT MESSAGE (OUTPATIENT)
Dept: OBSTETRICS AND GYNECOLOGY | Facility: CLINIC | Age: 25
End: 2025-06-18
Payer: COMMERCIAL

## 2025-06-18 DIAGNOSIS — E28.2 PCOS (POLYCYSTIC OVARIAN SYNDROME): ICD-10-CM

## 2025-06-18 DIAGNOSIS — E16.1 INCREASED INSULIN LEVEL: Primary | ICD-10-CM

## 2025-06-20 LAB
B-HCG SERPL-ACNC: <5 MIU/ML
ESTRADIOL SERPL-MCNC: 58 PG/ML
FSH SERPL-ACNC: 4.1 MIU/ML
INSULIN SERPL-ACNC: 25.3 UIU/ML
LH SERPL-ACNC: 15.1 MIU/ML
PROLACTIN SERPL-MCNC: 10.7 NG/ML
TESTOST SERPL-MCNC: 49 NG/DL (ref 2–45)
TSH SERPL-ACNC: 2.21 MIU/L

## 2025-07-15 ENCOUNTER — PATIENT MESSAGE (OUTPATIENT)
Dept: OBSTETRICS AND GYNECOLOGY | Facility: CLINIC | Age: 25
End: 2025-07-15
Payer: COMMERCIAL

## 2025-07-16 ENCOUNTER — TELEPHONE (OUTPATIENT)
Dept: OBSTETRICS AND GYNECOLOGY | Facility: CLINIC | Age: 25
End: 2025-07-16
Payer: COMMERCIAL

## 2025-07-22 ENCOUNTER — APPOINTMENT (OUTPATIENT)
Dept: RADIOLOGY | Facility: HOSPITAL | Age: 25
End: 2025-07-22
Payer: COMMERCIAL

## 2025-07-22 ENCOUNTER — HOSPITAL ENCOUNTER (EMERGENCY)
Facility: HOSPITAL | Age: 25
Discharge: HOME | End: 2025-07-22
Attending: EMERGENCY MEDICINE
Payer: COMMERCIAL

## 2025-07-22 ENCOUNTER — APPOINTMENT (OUTPATIENT)
Dept: CARDIOLOGY | Facility: HOSPITAL | Age: 25
End: 2025-07-22
Payer: COMMERCIAL

## 2025-07-22 VITALS
DIASTOLIC BLOOD PRESSURE: 90 MMHG | TEMPERATURE: 96.6 F | OXYGEN SATURATION: 97 % | HEART RATE: 93 BPM | SYSTOLIC BLOOD PRESSURE: 112 MMHG | RESPIRATION RATE: 16 BRPM | BODY MASS INDEX: 34.96 KG/M2 | WEIGHT: 190 LBS | HEIGHT: 62 IN

## 2025-07-22 DIAGNOSIS — R07.9 CHEST PAIN, UNSPECIFIED TYPE: Primary | ICD-10-CM

## 2025-07-22 LAB
ALBUMIN SERPL BCP-MCNC: 5 G/DL (ref 3.4–5)
ALP SERPL-CCNC: 78 U/L (ref 33–110)
ALT SERPL W P-5'-P-CCNC: 83 U/L (ref 7–45)
ANION GAP SERPL CALC-SCNC: 14 MMOL/L (ref 10–20)
APPEARANCE UR: ABNORMAL
AST SERPL W P-5'-P-CCNC: 43 U/L (ref 9–39)
BASOPHILS # BLD AUTO: 0.03 X10*3/UL (ref 0–0.1)
BASOPHILS NFR BLD AUTO: 0.4 %
BILIRUB SERPL-MCNC: 0.7 MG/DL (ref 0–1.2)
BILIRUB UR STRIP.AUTO-MCNC: NEGATIVE MG/DL
BUN SERPL-MCNC: 9 MG/DL (ref 6–23)
CALCIUM SERPL-MCNC: 10.1 MG/DL (ref 8.6–10.3)
CARDIAC TROPONIN I PNL SERPL HS: <3 NG/L (ref 0–13)
CARDIAC TROPONIN I PNL SERPL HS: <3 NG/L (ref 0–13)
CHLORIDE SERPL-SCNC: 103 MMOL/L (ref 98–107)
CO2 SERPL-SCNC: 27 MMOL/L (ref 21–32)
COLOR UR: YELLOW
CREAT SERPL-MCNC: 0.92 MG/DL (ref 0.5–1.05)
EGFRCR SERPLBLD CKD-EPI 2021: 89 ML/MIN/1.73M*2
EOSINOPHIL # BLD AUTO: 0.06 X10*3/UL (ref 0–0.7)
EOSINOPHIL NFR BLD AUTO: 0.7 %
ERYTHROCYTE [DISTWIDTH] IN BLOOD BY AUTOMATED COUNT: 12.4 % (ref 11.5–14.5)
GLUCOSE SERPL-MCNC: 97 MG/DL (ref 74–99)
GLUCOSE UR STRIP.AUTO-MCNC: NEGATIVE MG/DL
HCG UR QL IA.RAPID: NEGATIVE
HCT VFR BLD AUTO: 47.1 % (ref 36–46)
HGB BLD-MCNC: 16.1 G/DL (ref 12–16)
HOLD SPECIMEN: NORMAL
IMM GRANULOCYTES # BLD AUTO: 0.02 X10*3/UL (ref 0–0.7)
IMM GRANULOCYTES NFR BLD AUTO: 0.2 % (ref 0–0.9)
KETONES UR STRIP.AUTO-MCNC: NEGATIVE MG/DL
LEUKOCYTE ESTERASE UR QL STRIP.AUTO: ABNORMAL
LYMPHOCYTES # BLD AUTO: 2.13 X10*3/UL (ref 1.2–4.8)
LYMPHOCYTES NFR BLD AUTO: 26.5 %
MAGNESIUM SERPL-MCNC: 2.2 MG/DL (ref 1.6–2.4)
MCH RBC QN AUTO: 29.5 PG (ref 26–34)
MCHC RBC AUTO-ENTMCNC: 34.2 G/DL (ref 32–36)
MCV RBC AUTO: 86 FL (ref 80–100)
MONOCYTES # BLD AUTO: 0.44 X10*3/UL (ref 0.1–1)
MONOCYTES NFR BLD AUTO: 5.5 %
MUCOUS THREADS #/AREA URNS AUTO: NORMAL /LPF
NEUTROPHILS # BLD AUTO: 5.36 X10*3/UL (ref 1.2–7.7)
NEUTROPHILS NFR BLD AUTO: 66.7 %
NITRITE UR QL STRIP.AUTO: NEGATIVE
NRBC BLD-RTO: 0 /100 WBCS (ref 0–0)
PH UR STRIP.AUTO: 6 [PH]
PLATELET # BLD AUTO: 326 X10*3/UL (ref 150–450)
POTASSIUM SERPL-SCNC: 4 MMOL/L (ref 3.5–5.3)
PROT SERPL-MCNC: 8.1 G/DL (ref 6.4–8.2)
PROT UR STRIP.AUTO-MCNC: NEGATIVE MG/DL
RBC # BLD AUTO: 5.46 X10*6/UL (ref 4–5.2)
RBC # UR STRIP.AUTO: ABNORMAL MG/DL
RBC #/AREA URNS AUTO: NORMAL /HPF
SODIUM SERPL-SCNC: 140 MMOL/L (ref 136–145)
SP GR UR STRIP.AUTO: 1.01
SQUAMOUS #/AREA URNS AUTO: NORMAL /HPF
UROBILINOGEN UR STRIP.AUTO-MCNC: ABNORMAL MG/DL
WBC # BLD AUTO: 8 X10*3/UL (ref 4.4–11.3)
WBC #/AREA URNS AUTO: NORMAL /HPF

## 2025-07-22 PROCEDURE — 99285 EMERGENCY DEPT VISIT HI MDM: CPT | Mod: 25 | Performed by: EMERGENCY MEDICINE

## 2025-07-22 PROCEDURE — 36415 COLL VENOUS BLD VENIPUNCTURE: CPT | Performed by: EMERGENCY MEDICINE

## 2025-07-22 PROCEDURE — 93005 ELECTROCARDIOGRAM TRACING: CPT

## 2025-07-22 PROCEDURE — 83735 ASSAY OF MAGNESIUM: CPT | Performed by: EMERGENCY MEDICINE

## 2025-07-22 PROCEDURE — 87086 URINE CULTURE/COLONY COUNT: CPT | Mod: PORLAB | Performed by: EMERGENCY MEDICINE

## 2025-07-22 PROCEDURE — 81025 URINE PREGNANCY TEST: CPT | Performed by: EMERGENCY MEDICINE

## 2025-07-22 PROCEDURE — 85025 COMPLETE CBC W/AUTO DIFF WBC: CPT | Performed by: EMERGENCY MEDICINE

## 2025-07-22 PROCEDURE — 71045 X-RAY EXAM CHEST 1 VIEW: CPT

## 2025-07-22 PROCEDURE — 84484 ASSAY OF TROPONIN QUANT: CPT | Performed by: EMERGENCY MEDICINE

## 2025-07-22 PROCEDURE — 81001 URINALYSIS AUTO W/SCOPE: CPT | Performed by: EMERGENCY MEDICINE

## 2025-07-22 PROCEDURE — 71045 X-RAY EXAM CHEST 1 VIEW: CPT | Mod: FOREIGN READ | Performed by: RADIOLOGY

## 2025-07-22 PROCEDURE — 80053 COMPREHEN METABOLIC PANEL: CPT | Performed by: EMERGENCY MEDICINE

## 2025-07-22 ASSESSMENT — PAIN DESCRIPTION - LOCATION: LOCATION: CHEST

## 2025-07-22 ASSESSMENT — PAIN - FUNCTIONAL ASSESSMENT: PAIN_FUNCTIONAL_ASSESSMENT: 0-10

## 2025-07-22 ASSESSMENT — PAIN DESCRIPTION - PAIN TYPE: TYPE: ACUTE PAIN

## 2025-07-22 ASSESSMENT — PAIN SCALES - GENERAL: PAINLEVEL_OUTOF10: 6

## 2025-07-22 NOTE — ED TRIAGE NOTES
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  Dr Bowden requesting troponin series and stress test.

## 2025-07-22 NOTE — ED PROVIDER NOTES
HPI   Chief Complaint   Patient presents with    Chest Pain       Patient presents to the emergency department for chest pressure.  Chest pressure started yesterday when patient was at work.  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  She describes the chest pain as pressure-like.  She denies associated diaphoresis, nausea, shortness of breath.                          Tasha Coma Scale Score: 15                  Patient History   Medical History[1]  Surgical History[2]  Family History[3]  Social History[4]    Physical Exam   ED Triage Vitals   Temperature Heart Rate Respirations BP   07/22/25 1038 07/22/25 1038 07/22/25 1038 07/22/25 1038   35.9 °C (96.6 °F) 85 16 (!) 129/92      Pulse Ox Temp Source Heart Rate Source Patient Position   07/22/25 1038 07/22/25 1038 07/22/25 1156 --   98 % Tympanic Monitor       BP Location FiO2 (%)     07/22/25 1038 --     Left arm          Physical Exam  Vitals and nursing note reviewed.   Constitutional:       General: She is not in acute distress.     Appearance: She is well-developed.   HENT:      Head: Normocephalic and atraumatic.      Right Ear: External ear normal.      Left Ear: External ear normal.      Nose: Nose normal.      Mouth/Throat:      Mouth: Mucous membranes are moist.      Pharynx: Oropharynx is clear.     Eyes:      Extraocular Movements: Extraocular movements intact.      Conjunctiva/sclera: Conjunctivae normal.     Neck:      Comments: Trachea midline  Cardiovascular:      Rate and Rhythm: Normal rate.      Pulses: Normal pulses.   Pulmonary:      Effort: Pulmonary effort is normal. No respiratory distress.      Breath sounds: Normal breath sounds.   Abdominal:      General: Bowel sounds are normal.      Palpations: Abdomen is soft.      Tenderness: There is no abdominal tenderness.     Musculoskeletal:         General: No swelling or tenderness.      Cervical back: No tenderness.     Skin:     General: Skin is warm and dry.       Capillary Refill: Capillary refill takes less than 2 seconds.     Neurological:      General: No focal deficit present.      Mental Status: She is alert and oriented to person, place, and time.     Psychiatric:         Mood and Affect: Mood normal.         Thought Content: Thought content does not include homicidal or suicidal ideation.         ED Course & MDM   Diagnoses as of 07/22/25 1323   Chest pain, unspecified type       Medical Decision Making  Patient presents with chest pressure. Available chart reviewed. On initial assessment the patient was found non-toxic, no acute distress, vitals hemodynamically stable and afebrile. Initial concern for MI, ACS, pneumonia, pneumothorax.  EKG shows normal sinus rhythm, rate 96, , QRS is 82 QTc is 439, no ST segment changes, no T wave changes.  Chest x-ray personally reviewed without pneumothorax or consolidations.  Troponins normal x 2.  Urinalysis is not concerning for infection.  Magnesium is normal at 2.2.  Metabolic panel shows no electrolyte abnormalities, normal kidney function with slight elevation of AST and ALT.  Patient's not pregnant by urine.  CBC shows no leukocytosis, no anemia, no thrombocytopenia.  Will schedule for outpatient stress test.    No indication for admission.  Discussed findings and diagnosis with the patient, follow-up and return to ED precautions given, patient voiced understanding, agrees with plan, questions answered, patient was discharged in stable condition.        Procedure  Procedures       [1]   Past Medical History:  Diagnosis Date    Depression     Diabetes (Multi)     Kidney stone 9/22/23    PCOS (polycystic ovarian syndrome)     Rectal bleeding    [2]   Past Surgical History:  Procedure Laterality Date    EXCISIONAL HEMORRHOIDECTOMY  04/08/2025    WISDOM TOOTH EXTRACTION     [3]   Family History  Problem Relation Name Age of Onset    Hypertension Mother      Breast cancer Mother      Diabetes Maternal Grandmother       Hypertension Maternal Grandmother      Breast cancer Maternal Grandmother     [4]   Social History  Tobacco Use    Smoking status: Never    Smokeless tobacco: Never   Vaping Use    Vaping status: Former    Substances: Nicotine    Devices: Disposable   Substance Use Topics    Alcohol use: Not Currently     Alcohol/week: 4.0 standard drinks of alcohol     Types: 2 Glasses of wine, 2 Standard drinks or equivalent per week     Comment: occasionally    Drug use: Never        Rj Murry MD  07/22/25 8656

## 2025-07-23 ENCOUNTER — APPOINTMENT (OUTPATIENT)
Dept: CARDIOLOGY | Facility: HOSPITAL | Age: 25
End: 2025-07-23
Payer: COMMERCIAL

## 2025-07-24 ENCOUNTER — PHARMACY VISIT (OUTPATIENT)
Dept: PHARMACY | Facility: CLINIC | Age: 25
End: 2025-07-24
Payer: MEDICAID

## 2025-07-24 DIAGNOSIS — N39.0 URINARY TRACT INFECTION WITHOUT HEMATURIA, SITE UNSPECIFIED: Primary | ICD-10-CM

## 2025-07-24 DIAGNOSIS — R30.0 DYSURIA: ICD-10-CM

## 2025-07-24 LAB — BACTERIA UR CULT: ABNORMAL

## 2025-07-24 PROCEDURE — RXMED WILLOW AMBULATORY MEDICATION CHARGE

## 2025-07-24 RX ORDER — AMOXICILLIN 500 MG/1
500 CAPSULE ORAL 2 TIMES DAILY
Qty: 6 CAPSULE | Refills: 0 | Status: SHIPPED | OUTPATIENT
Start: 2025-07-24 | End: 2025-07-29 | Stop reason: ALTCHOICE

## 2025-07-25 ENCOUNTER — RESULTS FOLLOW-UP (OUTPATIENT)
Dept: PHARMACY | Facility: HOSPITAL | Age: 25
End: 2025-07-25
Payer: COMMERCIAL

## 2025-07-25 NOTE — PROGRESS NOTES
EDPD Note: Rapid Result Review    I reviewed Cari Forde 's chart regarding a positive urine culture/result that was taken during their recent emergency room visit. The patient was not told about their finalized results prior to leaving the emergency department. Therefore, patient was not contacted as proper education was given by another provider: Renan Clemente CNP. No further follow up needed from EDPD Team.     Susceptibility data from last 90 days.  Collected Specimen Info Organism   07/22/25 Urine from Clean Catch/Voided Streptococcus agalactiae (Group B Streptococcus)     Admission on 07/22/2025, Discharged on 07/22/2025   Component Date Value Ref Range Status    HCG, Urine 07/22/2025 NEGATIVE  NEGATIVE Final    WBC 07/22/2025 8.0  4.4 - 11.3 x10*3/uL Final    nRBC 07/22/2025 0.0  0.0 - 0.0 /100 WBCs Final    RBC 07/22/2025 5.46 (H)  4.00 - 5.20 x10*6/uL Final    Hemoglobin 07/22/2025 16.1 (H)  12.0 - 16.0 g/dL Final    Hematocrit 07/22/2025 47.1 (H)  36.0 - 46.0 % Final    MCV 07/22/2025 86  80 - 100 fL Final    MCH 07/22/2025 29.5  26.0 - 34.0 pg Final    MCHC 07/22/2025 34.2  32.0 - 36.0 g/dL Final    RDW 07/22/2025 12.4  11.5 - 14.5 % Final    Platelets 07/22/2025 326  150 - 450 x10*3/uL Final    Neutrophils % 07/22/2025 66.7  40.0 - 80.0 % Final    Immature Granulocytes %, Automated 07/22/2025 0.2  0.0 - 0.9 % Final    Immature Granulocyte Count (IG) includes promyelocytes, myelocytes and metamyelocytes but does not include bands. Percent differential counts (%) should be interpreted in the context of the absolute cell counts (cells/UL).    Lymphocytes % 07/22/2025 26.5  13.0 - 44.0 % Final    Monocytes % 07/22/2025 5.5  2.0 - 10.0 % Final    Eosinophils % 07/22/2025 0.7  0.0 - 6.0 % Final    Basophils % 07/22/2025 0.4  0.0 - 2.0 % Final    Neutrophils Absolute 07/22/2025 5.36  1.20 - 7.70 x10*3/uL Final    Percent differential counts (%) should be interpreted in the context of the absolute cell  counts (cells/uL).    Immature Granulocytes Absolute, Au* 07/22/2025 0.02  0.00 - 0.70 x10*3/uL Final    Lymphocytes Absolute 07/22/2025 2.13  1.20 - 4.80 x10*3/uL Final    Monocytes Absolute 07/22/2025 0.44  0.10 - 1.00 x10*3/uL Final    Eosinophils Absolute 07/22/2025 0.06  0.00 - 0.70 x10*3/uL Final    Basophils Absolute 07/22/2025 0.03  0.00 - 0.10 x10*3/uL Final    Glucose 07/22/2025 97  74 - 99 mg/dL Final    Sodium 07/22/2025 140  136 - 145 mmol/L Final    Potassium 07/22/2025 4.0  3.5 - 5.3 mmol/L Final    Chloride 07/22/2025 103  98 - 107 mmol/L Final    Bicarbonate 07/22/2025 27  21 - 32 mmol/L Final    Anion Gap 07/22/2025 14  10 - 20 mmol/L Final    Urea Nitrogen 07/22/2025 9  6 - 23 mg/dL Final    Creatinine 07/22/2025 0.92  0.50 - 1.05 mg/dL Final    eGFR 07/22/2025 89  >60 mL/min/1.73m*2 Final    Calculations of estimated GFR are performed using the 2021 CKD-EPI Study Refit equation without the race variable for the IDMS-Traceable creatinine methods.  https://jasn.asnjournals.org/content/early/2021/09/22/ASN.9321176969    Calcium 07/22/2025 10.1  8.6 - 10.3 mg/dL Final    Albumin 07/22/2025 5.0  3.4 - 5.0 g/dL Final    Alkaline Phosphatase 07/22/2025 78  33 - 110 U/L Final    Total Protein 07/22/2025 8.1  6.4 - 8.2 g/dL Final    AST 07/22/2025 43 (H)  9 - 39 U/L Final    Bilirubin, Total 07/22/2025 0.7  0.0 - 1.2 mg/dL Final    ALT 07/22/2025 83 (H)  7 - 45 U/L Final    Patients treated with Sulfasalazine may generate falsely decreased results for ALT.    Magnesium 07/22/2025 2.20  1.60 - 2.40 mg/dL Final    Color, Urine 07/22/2025 Yellow  Straw, Yellow Final    Appearance, Urine 07/22/2025 Hazy (N)  Clear Final    Specific Gravity, Urine 07/22/2025 1.015  1.005 - 1.035 Final    pH, Urine 07/22/2025 6.0  5.0, 5.5, 6.0, 6.5, 7.0, 7.5, 8.0 Final    Protein, Urine 07/22/2025 NEGATIVE  NEGATIVE, 10 (TRACE) mg/dL Final    Glucose, Urine 07/22/2025 NEGATIVE  NEGATIVE mg/dL Final    Blood, Urine 07/22/2025  0.2 (2+) (A)  NEGATIVE mg/dL Final    Ketones, Urine 07/22/2025 NEGATIVE  NEGATIVE mg/dL Final    Bilirubin, Urine 07/22/2025 NEGATIVE  NEGATIVE mg/dL Final    Urobilinogen, Urine 07/22/2025 NORM  NORM mg/dL Final    Nitrite, Urine 07/22/2025 NEGATIVE  NEGATIVE Final    Leukocyte Esterase, Urine 07/22/2025 25 Shmuel/uL (A)  NEGATIVE Final    Troponin I, High Sensitivity 07/22/2025 <3  0 - 13 ng/L Final    Troponin I, High Sensitivity 07/22/2025 <3  0 - 13 ng/L Final    WBC, Urine 07/22/2025 NONE  1-5, NONE /HPF Final    RBC, Urine 07/22/2025 3-5  NONE, 1-2, 3-5 /HPF Final    Squamous Epithelial Cells, Urine 07/22/2025 1-9 (SPARSE)  Reference range not established. /HPF Final    Mucus, Urine 07/22/2025 FEW  Reference range not established. /LPF Final    Urine Culture 07/22/2025 <=10,000 CFU/mL Streptococcus agalactiae (Group B Streptococcus) (A)   Final    Comment: Streptococcus agalactiae (Group B Streptococcus, GBS) may be significant in pregnant individuals. Recovery from non-pregnant individuals likely represents an insignificant finding. Routine GBS prenatal screening should be ordered using test “Group B                            Streptococcus (GBS) Prenatal Screen, Culture” ([id number]).       If there are any other questions for the ED Post-Discharge Culture Follow Up Team, please contact [phone]. Fax: [phone].    Belia Mtz, LeandroD

## 2025-07-29 ENCOUNTER — PHARMACY VISIT (OUTPATIENT)
Dept: PHARMACY | Facility: CLINIC | Age: 25
End: 2025-07-29
Payer: MEDICAID

## 2025-07-29 ENCOUNTER — APPOINTMENT (OUTPATIENT)
Dept: PRIMARY CARE | Facility: CLINIC | Age: 25
End: 2025-07-29
Payer: COMMERCIAL

## 2025-07-29 VITALS
HEART RATE: 79 BPM | HEIGHT: 62 IN | OXYGEN SATURATION: 98 % | SYSTOLIC BLOOD PRESSURE: 92 MMHG | DIASTOLIC BLOOD PRESSURE: 69 MMHG | WEIGHT: 208 LBS | BODY MASS INDEX: 38.28 KG/M2

## 2025-07-29 DIAGNOSIS — R79.89 ELEVATED LIVER FUNCTION TESTS: Primary | ICD-10-CM

## 2025-07-29 DIAGNOSIS — F41.9 ANXIETY: ICD-10-CM

## 2025-07-29 DIAGNOSIS — F41.0 PANIC ATTACK: ICD-10-CM

## 2025-07-29 PROCEDURE — 1036F TOBACCO NON-USER: CPT

## 2025-07-29 PROCEDURE — 3008F BODY MASS INDEX DOCD: CPT

## 2025-07-29 PROCEDURE — 99214 OFFICE O/P EST MOD 30 MIN: CPT

## 2025-07-29 PROCEDURE — RXMED WILLOW AMBULATORY MEDICATION CHARGE

## 2025-07-29 RX ORDER — SERTRALINE HYDROCHLORIDE 25 MG/1
25 TABLET, FILM COATED ORAL DAILY
Qty: 30 TABLET | Refills: 1 | Status: SHIPPED | OUTPATIENT
Start: 2025-07-29 | End: 2025-09-27

## 2025-07-29 RX ORDER — HYDROXYZINE HYDROCHLORIDE 25 MG/1
25 TABLET, FILM COATED ORAL EVERY 8 HOURS PRN
Qty: 30 TABLET | Refills: 2 | Status: SHIPPED | OUTPATIENT
Start: 2025-07-29 | End: 2025-08-28

## 2025-07-29 ASSESSMENT — PATIENT HEALTH QUESTIONNAIRE - PHQ9
1. LITTLE INTEREST OR PLEASURE IN DOING THINGS: SEVERAL DAYS
SUM OF ALL RESPONSES TO PHQ9 QUESTIONS 1 AND 2: 2
4. FEELING TIRED OR HAVING LITTLE ENERGY: NEARLY EVERY DAY
SUM OF ALL RESPONSES TO PHQ QUESTIONS 1-9: 10
6. FEELING BAD ABOUT YOURSELF - OR THAT YOU ARE A FAILURE OR HAVE LET YOURSELF OR YOUR FAMILY DOWN: NOT AT ALL
9. THOUGHTS THAT YOU WOULD BE BETTER OFF DEAD, OR OF HURTING YOURSELF: NOT AT ALL
3. TROUBLE FALLING OR STAYING ASLEEP OR SLEEPING TOO MUCH: NEARLY EVERY DAY
7. TROUBLE CONCENTRATING ON THINGS, SUCH AS READING THE NEWSPAPER OR WATCHING TELEVISION: NOT AT ALL
5. POOR APPETITE OR OVEREATING: MORE THAN HALF THE DAYS
8. MOVING OR SPEAKING SO SLOWLY THAT OTHER PEOPLE COULD HAVE NOTICED. OR THE OPPOSITE, BEING SO FIGETY OR RESTLESS THAT YOU HAVE BEEN MOVING AROUND A LOT MORE THAN USUAL: NOT AT ALL
2. FEELING DOWN, DEPRESSED OR HOPELESS: SEVERAL DAYS

## 2025-07-29 ASSESSMENT — ENCOUNTER SYMPTOMS
PALPITATIONS: 1
OCCASIONAL FEELINGS OF UNSTEADINESS: 0
NERVOUS/ANXIOUS: 1
LOSS OF SENSATION IN FEET: 0
DEPRESSION: 0

## 2025-07-29 ASSESSMENT — ANXIETY QUESTIONNAIRES
GAD7 TOTAL SCORE: 15
4. TROUBLE RELAXING: MORE THAN HALF THE DAYS
3. WORRYING TOO MUCH ABOUT DIFFERENT THINGS: NEARLY EVERY DAY
5. BEING SO RESTLESS THAT IT IS HARD TO SIT STILL: NOT AT ALL
7. FEELING AFRAID AS IF SOMETHING AWFUL MIGHT HAPPEN: MORE THAN HALF THE DAYS
IF YOU CHECKED OFF ANY PROBLEMS ON THIS QUESTIONNAIRE, HOW DIFFICULT HAVE THESE PROBLEMS MADE IT FOR YOU TO DO YOUR WORK, TAKE CARE OF THINGS AT HOME, OR GET ALONG WITH OTHER PEOPLE: SOMEWHAT DIFFICULT
1. FEELING NERVOUS, ANXIOUS, OR ON EDGE: NEARLY EVERY DAY
6. BECOMING EASILY ANNOYED OR IRRITABLE: MORE THAN HALF THE DAYS
2. NOT BEING ABLE TO STOP OR CONTROL WORRYING: NEARLY EVERY DAY

## 2025-07-29 NOTE — PROGRESS NOTES
"Subjective   Patient ID: Cari Forde is a 24 y.o. female who presents for Anxiety (Anxiety/ER follow up. Patient would like medication for anxiety panic attacks. Did finish antibiotic that Joann sent in and does not have any UTI sx.//Patient has been on lexapro from 2023-until 3 months ago due to side effects from increased dosage.).    Past Medical, Surgical, and Family History reviewed and updated in chart.     Reviewed all medications by prescribing practitioner or clinical pharmacist (such as prescriptions, OTCs, herbal therapies and supplements) and documented in the medical record.    HPI   24 yof in office for follow up after ED visit.     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   Cardiac wise was cleared. Was found to have UTI. Was treated and doing well now.     Feels this is anxiety:  Was on lexapro for postpartum after daughter was on 10mg and was up'd to 20mg but it did not seem to make much of a difference.   Has been having panic attacks 1-2 months. Daily has been having increase anxiety, scared to go to the store alone. Feels something bad is going to going to happen.   Mom has been aaliyah nd out of the hospital, feels like that could contribute to the last panic attack but otherwise not big life changes.   Starts counseling next month.     Discussed lab work ordered in ED. Liver function tests were elevated. Denies alcohol use. Elevated BMI. CT from 6/2025 shows no liver abnormality.     Review of Systems   Cardiovascular:  Positive for palpitations.   Psychiatric/Behavioral:  The patient is nervous/anxious.    All other systems reviewed and are negative.      Objective   BP 92/69   Pulse 79   Ht 1.575 m (5' 2.01\")   Wt 94.3 kg (208 lb)   LMP 07/19/2025   SpO2 98%   BMI 38.03 kg/m²     Physical Exam  Constitutional:       Appearance: Normal appearance. She is obese. "     Cardiovascular:      Rate and Rhythm: Normal rate and regular rhythm.      Pulses: Normal pulses.      Heart sounds: Normal heart sounds.     Neurological:      Mental Status: She is alert and oriented to person, place, and time.         Assessment/Plan   Problem List Items Addressed This Visit    None  Visit Diagnoses         Elevated liver function tests    -  Primary    Relevant Orders    Comprehensive metabolic panel    CBC and Auto Differential      Anxiety        start sertraline 25mg daily, hydroxyzine as needed for panic. follow up 8 wks. discussed genesight if we have trouble finding medication to help.    Relevant Medications    sertraline (Zoloft) 25 mg tablet    Other Relevant Orders    Follow Up In [facility] - PCP - Established      Panic attack        Relevant Medications    hydrOXYzine HCL (Atarax) 25 mg tablet

## 2025-07-29 NOTE — PATIENT INSTRUCTIONS
Blood work in 1 month to recheck liver function test.     Follow up in 8 weeks for anxiety.       Assessment/Plan   Problem List Items Addressed This Visit    None  Visit Diagnoses         Elevated liver function tests    -  Primary    Relevant Orders    Comprehensive metabolic panel    CBC and Auto Differential      Anxiety        start sertraline 25mg daily, hydroxyzine as needed for panic. follow up 8 wks. discussed genesight if we have trouble finding medication to help.    Relevant Medications    sertraline (Zoloft) 25 mg tablet    Other Relevant Orders    Follow Up In [facility] - PCP - Established      Panic attack        Relevant Medications    hydrOXYzine HCL (Atarax) 25 mg tablet

## 2025-07-31 ENCOUNTER — OFFICE VISIT (OUTPATIENT)
Dept: CARDIOLOGY | Facility: HOSPITAL | Age: 25
End: 2025-07-31
Payer: COMMERCIAL

## 2025-07-31 VITALS
HEART RATE: 97 BPM | BODY MASS INDEX: 34.96 KG/M2 | HEIGHT: 62 IN | DIASTOLIC BLOOD PRESSURE: 82 MMHG | WEIGHT: 190 LBS | SYSTOLIC BLOOD PRESSURE: 108 MMHG

## 2025-07-31 DIAGNOSIS — R07.9 CHEST PAIN, UNSPECIFIED TYPE: ICD-10-CM

## 2025-07-31 DIAGNOSIS — R94.31 ABNORMAL EKG: Primary | ICD-10-CM

## 2025-07-31 DIAGNOSIS — R03.0 ELEVATED BLOOD PRESSURE READING IN OFFICE WITHOUT DIAGNOSIS OF HYPERTENSION: ICD-10-CM

## 2025-07-31 PROCEDURE — 93005 ELECTROCARDIOGRAM TRACING: CPT | Performed by: INTERNAL MEDICINE

## 2025-07-31 PROCEDURE — 3008F BODY MASS INDEX DOCD: CPT | Performed by: INTERNAL MEDICINE

## 2025-07-31 PROCEDURE — 1036F TOBACCO NON-USER: CPT | Performed by: INTERNAL MEDICINE

## 2025-07-31 PROCEDURE — 99204 OFFICE O/P NEW MOD 45 MIN: CPT | Performed by: INTERNAL MEDICINE

## 2025-07-31 PROCEDURE — 93010 ELECTROCARDIOGRAM REPORT: CPT | Performed by: INTERNAL MEDICINE

## 2025-07-31 PROCEDURE — 99213 OFFICE O/P EST LOW 20 MIN: CPT | Mod: 25

## 2025-07-31 ASSESSMENT — ENCOUNTER SYMPTOMS
OCCASIONAL FEELINGS OF UNSTEADINESS: 0
DEPRESSION: 0
LOSS OF SENSATION IN FEET: 0

## 2025-07-31 NOTE — PROGRESS NOTES
"Chief Complaint:   New Patient Visit (Chest tightness)     History Of Present Illness:    Cari Forde is a 24 y.o. female presenting for evaluation of chest pressure.  She describes this as a heaviness in the upper chest without radiation, 5 out of 10 in intensity. She wakes up with it in the morning.  Subsequently dissipates throughout the day.  No clear aggravating or relieving factors, specifically no change with physical activities.  Recently under a lot of stress because of mother's illness.  Went to the ER, ruled out for acute coronary syndrome, had a CT angiogram that ruled out PE, no coronary artery calcification was seen.  Chest x-ray was negative.    ECG mildly abnormal with nonspecific ST-T changes and minimal voltage criteria for LVH.  She has past medical history of PCOS on metformin, BMI of 34, history of dizziness on as needed Inderal, kidney stone, abnormal LFTs being investigated by PCP.    No family history of heart disease.    Last Recorded Vitals:  Vitals:    07/31/25 1019   Weight: 86.2 kg (190 lb)   Height: 1.575 m (5' 2\")       Past Medical History:  She has a past medical history of Anxiety, Depression, Diabetes (Multi), Headache, Kidney stone (9/22/23), PCOS (polycystic ovarian syndrome), and Rectal bleeding.    She has no past medical history of Personal history of irradiation.    Past Surgical History:  She has a past surgical history that includes [place] tooth extraction and Excisional hemorrhoidectomy (04/08/2025).      Social History:  She reports that she has never smoked. She has never used smokeless tobacco. She reports that she does not currently use alcohol after a past usage of about 4.0 standard drinks of alcohol per week. She reports that she does not use drugs.    Family History:  Family History[1]     Allergies:  Patient has no known allergies.    Outpatient Medications:  Current Outpatient Medications   Medication Instructions    hydrOXYzine HCL (ATARAX) 25 mg, oral, " "Every 8 hours PRN    magnesium glycinate 100 mg, oral, Daily    meclizine (ANTIVERT) 25 mg, oral, 3 times daily PRN    metFORMIN XR (GLUCOPHAGE-XR) 500 mg, oral, Daily with evening meal, Do not crush, chew, or split.    propranolol (INDERAL) 20 mg, oral, 2 times daily    riboflavin (VITAMIN B2) 100 mg, oral, Daily    sertraline (ZOLOFT) 25 mg, oral, Daily       Physical Exam:  Physical Exam  Vitals reviewed.   Constitutional:       Appearance: Normal appearance.   Neck:      Vascular: No carotid bruit or JVD.     Cardiovascular:      Rate and Rhythm: Normal rate and regular rhythm.      Pulses: Normal pulses.      Heart sounds: Normal heart sounds, S1 normal and S2 normal.   Pulmonary:      Effort: Pulmonary effort is normal. No respiratory distress.      Breath sounds: No wheezing or rales.   Abdominal:      General: Abdomen is flat.      Palpations: Abdomen is soft.     Musculoskeletal:      Right lower leg: No edema.      Left lower leg: No edema.     Skin:     General: Skin is warm.     Neurological:      Mental Status: She is alert and oriented to person, place, and time. Mental status is at baseline.     Psychiatric:         Mood and Affect: Mood normal.         Behavior: Behavior normal.           Last Labs:  CBC -  Lab Results   Component Value Date    WBC 8.0 07/22/2025    HGB 16.1 (H) 07/22/2025    HCT 47.1 (H) 07/22/2025    MCV 86 07/22/2025     07/22/2025       CMP -  Lab Results   Component Value Date    CALCIUM 10.1 07/22/2025    PROT 8.1 07/22/2025    ALBUMIN 5.0 07/22/2025    AST 43 (H) 07/22/2025    ALT 83 (H) 07/22/2025    ALKPHOS 78 07/22/2025    BILITOT 0.7 07/22/2025       LIPID PANEL -   No results found for: \"CHOL\", \"TRIG\", \"HDL\", \"CHHDL\", \"LDLF\", \"VLDL\", \"NHDL\"    RENAL FUNCTION PANEL -   Lab Results   Component Value Date    GLUCOSE 97 07/22/2025     07/22/2025    K 4.0 07/22/2025     07/22/2025    CO2 27 07/22/2025    ANIONGAP 14 07/22/2025    BUN 9 07/22/2025    " "CREATININE 0.92 07/22/2025    CALCIUM 10.1 07/22/2025    ALBUMIN 5.0 07/22/2025        Lab Results   Component Value Date    HGBA1C 5.4 06/19/2025    HGBA1C 5.5 02/21/2025       Last Cardiology Tests:  ECG:  ECG 12 lead 06/02/2025      Echo:  No results found for this or any previous visit from the past 1095 days.      Ejection Fractions:  No results found for: \"EF\"    Cath:  No results found for this or any previous visit from the past 1095 days.      Stress Test:  No results found for this or any previous visit from the past 1095 days.      Cardiac Imaging:  No results found for this or any previous visit from the past 1095 days.          Assessment/Plan   1-chest pain-atypical, could be related to acid reflux, reactive airway disease, musculoskeletal, stress anxiety related, with ischemic or structural heart disease being lower in the differential.    Advised to follow-up with PCP for evaluation for noncardiac conditions.  Given abnormal EKG will arrange for echocardiogram and stress testing.    2-elevated blood pressure without diagnosis of hypertension-elevated diastolic blood pressure.  Follow-up with BISHNU with weekly blood pressure readings for further evaluation and treatment if needed.      3-PCOS/abnormal LFTs-could be associated with dyslipidemia and metabolic syndrome-check fasting lipid profile.        Carolee Warren MD         [1]   Family History  Problem Relation Name Age of Onset    Hypertension Mother      Breast cancer Mother      Diabetes Maternal Grandmother      Hypertension Maternal Grandmother      Breast cancer Maternal Grandmother       "

## 2025-08-01 ENCOUNTER — APPOINTMENT (OUTPATIENT)
Dept: CARDIOLOGY | Facility: HOSPITAL | Age: 25
End: 2025-08-01
Payer: COMMERCIAL

## 2025-08-01 ENCOUNTER — HOSPITAL ENCOUNTER (OUTPATIENT)
Dept: CARDIOLOGY | Facility: HOSPITAL | Age: 25
Discharge: HOME | End: 2025-08-01
Payer: COMMERCIAL

## 2025-08-01 DIAGNOSIS — R07.9 CHEST PAIN, UNSPECIFIED TYPE: ICD-10-CM

## 2025-08-01 DIAGNOSIS — R94.31 ABNORMAL EKG: ICD-10-CM

## 2025-08-01 LAB
ATRIAL RATE: 97 BPM
P AXIS: 46 DEGREES
P OFFSET: 206 MS
P ONSET: 157 MS
PR INTERVAL: 130 MS
Q ONSET: 222 MS
QRS COUNT: 16 BEATS
QRS DURATION: 80 MS
QT INTERVAL: 338 MS
QTC CALCULATION(BAZETT): 429 MS
QTC FREDERICIA: 396 MS
R AXIS: 15 DEGREES
T AXIS: 50 DEGREES
T OFFSET: 391 MS
VENTRICULAR RATE: 97 BPM

## 2025-08-01 PROCEDURE — 2500000004 HC RX 250 GENERAL PHARMACY W/ HCPCS (ALT 636 FOR OP/ED): Mod: JW | Performed by: STUDENT IN AN ORGANIZED HEALTH CARE EDUCATION/TRAINING PROGRAM

## 2025-08-01 PROCEDURE — C8929 TTE W OR WO FOL WCON,DOPPLER: HCPCS

## 2025-08-01 PROCEDURE — 93306 TTE W/DOPPLER COMPLETE: CPT | Performed by: STUDENT IN AN ORGANIZED HEALTH CARE EDUCATION/TRAINING PROGRAM

## 2025-08-01 RX ADMIN — HUMAN ALBUMIN MICROSPHERES AND PERFLUTREN 0.5 ML: 10; .22 INJECTION, SOLUTION INTRAVENOUS at 10:08

## 2025-08-02 LAB
AORTIC VALVE MEAN GRADIENT: 3 MMHG
AORTIC VALVE PEAK VELOCITY: 1.17 M/S
AV PEAK GRADIENT: 5 MMHG
AVA (PEAK VEL): 2.45 CM2
AVA (VTI): 2.45 CM2
EJECTION FRACTION APICAL 4 CHAMBER: 55.2
EJECTION FRACTION: 58 %
LEFT ATRIUM VOLUME AREA LENGTH INDEX BSA: 8.1 ML/M2
LEFT VENTRICLE INTERNAL DIMENSION DIASTOLE: 4.77 CM (ref 3.5–6)
LEFT VENTRICULAR OUTFLOW TRACT DIAMETER: 1.97 CM
LV EJECTION FRACTION BIPLANE: 45 %
MITRAL VALVE E/A RATIO: 0.85
MITRAL VALVE E/E' RATIO: 5.77
RIGHT VENTRICLE FREE WALL PEAK S': 10.5 CM/S
RIGHT VENTRICLE PEAK SYSTOLIC PRESSURE: 16 MMHG
TRICUSPID ANNULAR PLANE SYSTOLIC EXCURSION: 2 CM

## 2025-08-04 ENCOUNTER — RESULTS FOLLOW-UP (OUTPATIENT)
Dept: CARDIOLOGY | Facility: HOSPITAL | Age: 25
End: 2025-08-04
Payer: COMMERCIAL

## 2025-08-04 ENCOUNTER — APPOINTMENT (OUTPATIENT)
Dept: CARDIOLOGY | Facility: HOSPITAL | Age: 25
End: 2025-08-04
Payer: COMMERCIAL

## 2025-08-04 LAB
ATRIAL RATE: 96 BPM
P AXIS: 45 DEGREES
P OFFSET: 201 MS
P ONSET: 154 MS
PR INTERVAL: 136 MS
Q ONSET: 222 MS
QRS COUNT: 16 BEATS
QRS DURATION: 82 MS
QT INTERVAL: 348 MS
QTC CALCULATION(BAZETT): 439 MS
QTC FREDERICIA: 407 MS
R AXIS: 13 DEGREES
T AXIS: 40 DEGREES
T OFFSET: 396 MS
VENTRICULAR RATE: 96 BPM

## 2025-08-04 NOTE — TELEPHONE ENCOUNTER
----- Message from Carolee Warren sent at 8/4/2025 12:42 PM EDT -----  Results reviewed. No critical results, follow up as usual to discuss in details.   ----- Message -----  From: Christian Syngo - Cardiology Results In  Sent: 8/2/2025   8:44 PM EDT  To: Carolee Warren MD

## 2025-08-05 ENCOUNTER — APPOINTMENT (OUTPATIENT)
Dept: PRIMARY CARE | Facility: CLINIC | Age: 25
End: 2025-08-05
Payer: COMMERCIAL

## 2025-08-05 NOTE — PROGRESS NOTES
Subjective   Patient ID: Cari Forde is a 24 y.o. female who presents for Nurse Visit (Pt here for [employer] testing. ).    HPI     Review of Systems    Objective   LMP 07/19/2025     Physical Exam    Assessment/Plan

## 2025-08-08 ENCOUNTER — PATIENT MESSAGE (OUTPATIENT)
Dept: PRIMARY CARE | Facility: CLINIC | Age: 25
End: 2025-08-08
Payer: COMMERCIAL

## 2025-08-08 ENCOUNTER — HOSPITAL ENCOUNTER (OUTPATIENT)
Dept: CARDIOLOGY | Facility: HOSPITAL | Age: 25
Discharge: HOME | End: 2025-08-08
Payer: COMMERCIAL

## 2025-08-08 DIAGNOSIS — R94.31 ABNORMAL EKG: ICD-10-CM

## 2025-08-08 DIAGNOSIS — R07.9 CHEST PAIN, UNSPECIFIED TYPE: ICD-10-CM

## 2025-08-08 PROCEDURE — 93018 CV STRESS TEST I&R ONLY: CPT | Performed by: STUDENT IN AN ORGANIZED HEALTH CARE EDUCATION/TRAINING PROGRAM

## 2025-08-08 PROCEDURE — 2500000004 HC RX 250 GENERAL PHARMACY W/ HCPCS (ALT 636 FOR OP/ED): Mod: JZ | Performed by: INTERNAL MEDICINE

## 2025-08-08 PROCEDURE — 93017 CV STRESS TEST TRACING ONLY: CPT

## 2025-08-08 PROCEDURE — 93016 CV STRESS TEST SUPVJ ONLY: CPT | Performed by: STUDENT IN AN ORGANIZED HEALTH CARE EDUCATION/TRAINING PROGRAM

## 2025-08-08 PROCEDURE — 93350 STRESS TTE ONLY: CPT | Performed by: STUDENT IN AN ORGANIZED HEALTH CARE EDUCATION/TRAINING PROGRAM

## 2025-08-08 RX ADMIN — PERFLUTREN 8 ML OF DILUTION: 6.52 INJECTION, SUSPENSION INTRAVENOUS at 14:07

## 2025-08-12 ENCOUNTER — APPOINTMENT (OUTPATIENT)
Dept: CARDIOLOGY | Facility: HOSPITAL | Age: 25
End: 2025-08-12
Payer: COMMERCIAL

## 2025-08-18 ENCOUNTER — APPOINTMENT (OUTPATIENT)
Dept: CARDIOLOGY | Facility: HOSPITAL | Age: 25
End: 2025-08-18
Payer: COMMERCIAL

## 2025-08-19 ENCOUNTER — APPOINTMENT (OUTPATIENT)
Dept: CARDIOLOGY | Facility: HOSPITAL | Age: 25
End: 2025-08-19
Payer: COMMERCIAL

## 2025-09-24 ENCOUNTER — APPOINTMENT (OUTPATIENT)
Dept: PRIMARY CARE | Facility: CLINIC | Age: 25
End: 2025-09-24
Payer: COMMERCIAL

## 2025-11-06 ENCOUNTER — APPOINTMENT (OUTPATIENT)
Dept: PRIMARY CARE | Facility: CLINIC | Age: 25
End: 2025-11-06
Payer: COMMERCIAL

## 2025-12-09 ENCOUNTER — APPOINTMENT (OUTPATIENT)
Dept: ENDOCRINOLOGY | Facility: CLINIC | Age: 25
End: 2025-12-09
Payer: COMMERCIAL

## (undated) DEVICE — NEEDLE, SAFETY, 25 GA X 1.5 IN

## (undated) DEVICE — PANTY, MESH, X-LARGE, GREEN

## (undated) DEVICE — Device

## (undated) DEVICE — PREP TRAY, DRY SKIN SCRUB KIT

## (undated) DEVICE — COVER HANDLE LIGHT, STERIS, BLUE, STERILE